# Patient Record
Sex: FEMALE | Race: WHITE | NOT HISPANIC OR LATINO | Employment: OTHER | URBAN - METROPOLITAN AREA
[De-identification: names, ages, dates, MRNs, and addresses within clinical notes are randomized per-mention and may not be internally consistent; named-entity substitution may affect disease eponyms.]

---

## 2017-02-21 ENCOUNTER — APPOINTMENT (OUTPATIENT)
Dept: WOMENS IMAGING | Facility: HOSPITAL | Age: 27
End: 2017-02-21

## 2017-02-21 PROCEDURE — 76641 ULTRASOUND BREAST COMPLETE: CPT | Performed by: RADIOLOGY

## 2017-08-15 ENCOUNTER — APPOINTMENT (OUTPATIENT)
Dept: WOMENS IMAGING | Facility: HOSPITAL | Age: 27
End: 2017-08-15

## 2017-08-15 PROCEDURE — MDREVIEWSP: Performed by: RADIOLOGY

## 2017-08-15 PROCEDURE — 76641 ULTRASOUND BREAST COMPLETE: CPT | Performed by: RADIOLOGY

## 2018-08-16 ENCOUNTER — APPOINTMENT (OUTPATIENT)
Dept: WOMENS IMAGING | Facility: HOSPITAL | Age: 28
End: 2018-08-16

## 2018-08-16 PROCEDURE — 76641 ULTRASOUND BREAST COMPLETE: CPT | Performed by: RADIOLOGY

## 2018-08-16 PROCEDURE — MDREVIEWSP: Performed by: RADIOLOGY

## 2019-06-06 ENCOUNTER — APPOINTMENT (OUTPATIENT)
Dept: WOMENS IMAGING | Facility: HOSPITAL | Age: 29
End: 2019-06-06

## 2019-06-06 PROCEDURE — 76641 ULTRASOUND BREAST COMPLETE: CPT | Performed by: RADIOLOGY

## 2019-06-11 LAB
EXTERNAL ABO GROUPING: NORMAL
EXTERNAL ANTIBODY SCREEN: NEGATIVE
EXTERNAL GROUP B STREP ANTIGEN: NEGATIVE
EXTERNAL HEPATITIS B SURFACE ANTIGEN: NEGATIVE
EXTERNAL HEPATITIS C AB: NORMAL
EXTERNAL RUBELLA QUALITATIVE: NORMAL
EXTERNAL SYPHILIS RPR SCREEN: NORMAL
HIV1 P24 AG SERPL QL IA: NEGATIVE

## 2019-06-26 ENCOUNTER — APPOINTMENT (OUTPATIENT)
Dept: WOMENS IMAGING | Facility: HOSPITAL | Age: 29
End: 2019-06-26

## 2019-06-26 PROCEDURE — 19083 BX BREAST 1ST LESION US IMAG: CPT | Performed by: RADIOLOGY

## 2019-12-27 ENCOUNTER — APPOINTMENT (OUTPATIENT)
Dept: WOMENS IMAGING | Facility: HOSPITAL | Age: 29
End: 2019-12-27

## 2019-12-27 PROCEDURE — 76641 ULTRASOUND BREAST COMPLETE: CPT | Performed by: RADIOLOGY

## 2020-01-06 ENCOUNTER — HOSPITAL ENCOUNTER (INPATIENT)
Facility: HOSPITAL | Age: 30
LOS: 2 days | Discharge: HOME | End: 2020-01-09
Attending: OBSTETRICS & GYNECOLOGY | Admitting: OBSTETRICS & GYNECOLOGY

## 2020-01-06 DIAGNOSIS — Z3A.39 39 WEEKS GESTATION OF PREGNANCY: Primary | ICD-10-CM

## 2020-01-06 PROCEDURE — C1755 CATHETER, INTRASPINAL: HCPCS

## 2020-01-07 ENCOUNTER — ANESTHESIA (OUTPATIENT)
Dept: LABOR AND DELIVERY | Facility: HOSPITAL | Age: 30
End: 2020-01-07

## 2020-01-07 ENCOUNTER — ANESTHESIA EVENT (OUTPATIENT)
Dept: LABOR AND DELIVERY | Facility: HOSPITAL | Age: 30
End: 2020-01-07

## 2020-01-07 PROBLEM — Z34.90 PREGNANCY: Status: ACTIVE | Noted: 2020-01-07

## 2020-01-07 LAB
ABO GROUP BLD: NORMAL
BASOPHILS # BLD AUTO: 0.05 10*3/MM3 (ref 0–0.2)
BASOPHILS NFR BLD AUTO: 0.3 % (ref 0–1.5)
BLD GP AB SCN SERPL QL: NEGATIVE
DEPRECATED RDW RBC AUTO: 39.8 FL (ref 37–54)
EOSINOPHIL # BLD AUTO: 0.03 10*3/MM3 (ref 0–0.4)
EOSINOPHIL NFR BLD AUTO: 0.2 % (ref 0.3–6.2)
ERYTHROCYTE [DISTWIDTH] IN BLOOD BY AUTOMATED COUNT: 12.6 % (ref 12.3–15.4)
HCT VFR BLD AUTO: 39.4 % (ref 34–46.6)
HGB BLD-MCNC: 13.9 G/DL (ref 12–15.9)
IMM GRANULOCYTES # BLD AUTO: 0.08 10*3/MM3 (ref 0–0.05)
IMM GRANULOCYTES NFR BLD AUTO: 0.5 % (ref 0–0.5)
LYMPHOCYTES # BLD AUTO: 1.53 10*3/MM3 (ref 0.7–3.1)
LYMPHOCYTES NFR BLD AUTO: 8.9 % (ref 19.6–45.3)
MCH RBC QN AUTO: 31 PG (ref 26.6–33)
MCHC RBC AUTO-ENTMCNC: 35.3 G/DL (ref 31.5–35.7)
MCV RBC AUTO: 87.8 FL (ref 79–97)
MONOCYTES # BLD AUTO: 0.86 10*3/MM3 (ref 0.1–0.9)
MONOCYTES NFR BLD AUTO: 5 % (ref 5–12)
NEUTROPHILS # BLD AUTO: 14.72 10*3/MM3 (ref 1.7–7)
NEUTROPHILS NFR BLD AUTO: 85.1 % (ref 42.7–76)
NRBC BLD AUTO-RTO: 0 /100 WBC (ref 0–0.2)
PH FLD: 7.5 [PH]
PLATELET # BLD AUTO: 201 10*3/MM3 (ref 140–450)
PMV BLD AUTO: 10.7 FL (ref 6–12)
RBC # BLD AUTO: 4.49 10*6/MM3 (ref 3.77–5.28)
RH BLD: POSITIVE
T&S EXPIRATION DATE: NORMAL
WBC NRBC COR # BLD: 17.27 10*3/MM3 (ref 3.4–10.8)

## 2020-01-07 PROCEDURE — C1755 CATHETER, INTRASPINAL: HCPCS | Performed by: ANESTHESIOLOGY

## 2020-01-07 PROCEDURE — 86900 BLOOD TYPING SEROLOGIC ABO: CPT | Performed by: OBSTETRICS & GYNECOLOGY

## 2020-01-07 PROCEDURE — 25010000002 ROPIVACAINE PER 1 MG: Performed by: STUDENT IN AN ORGANIZED HEALTH CARE EDUCATION/TRAINING PROGRAM

## 2020-01-07 PROCEDURE — 86901 BLOOD TYPING SEROLOGIC RH(D): CPT | Performed by: OBSTETRICS & GYNECOLOGY

## 2020-01-07 PROCEDURE — 25010000002 FENTANYL CITRATE (PF) 2500 MCG/50ML SOLUTION: Performed by: STUDENT IN AN ORGANIZED HEALTH CARE EDUCATION/TRAINING PROGRAM

## 2020-01-07 PROCEDURE — 85025 COMPLETE CBC W/AUTO DIFF WBC: CPT | Performed by: OBSTETRICS & GYNECOLOGY

## 2020-01-07 PROCEDURE — 86850 RBC ANTIBODY SCREEN: CPT | Performed by: OBSTETRICS & GYNECOLOGY

## 2020-01-07 RX ORDER — ACETAMINOPHEN 325 MG/1
650 TABLET ORAL EVERY 4 HOURS PRN
Status: DISCONTINUED | OUTPATIENT
Start: 2020-01-07 | End: 2020-01-08 | Stop reason: HOSPADM

## 2020-01-07 RX ORDER — ERYTHROMYCIN 5 MG/G
OINTMENT OPHTHALMIC
Status: DISPENSED
Start: 2020-01-07 | End: 2020-01-08

## 2020-01-07 RX ORDER — DIPHENHYDRAMINE HYDROCHLORIDE 50 MG/ML
12.5 INJECTION INTRAMUSCULAR; INTRAVENOUS EVERY 8 HOURS PRN
Status: DISCONTINUED | OUTPATIENT
Start: 2020-01-07 | End: 2020-01-08 | Stop reason: HOSPADM

## 2020-01-07 RX ORDER — MISOPROSTOL 200 UG/1
800 TABLET ORAL AS NEEDED
Status: DISCONTINUED | OUTPATIENT
Start: 2020-01-07 | End: 2020-01-08 | Stop reason: HOSPADM

## 2020-01-07 RX ORDER — MAGNESIUM GLUCONATE 27 MG(500)
200 TABLET ORAL DAILY
COMMUNITY
End: 2020-01-09 | Stop reason: HOSPADM

## 2020-01-07 RX ORDER — OXYTOCIN-SODIUM CHLORIDE 0.9% IV SOLN 30 UNIT/500ML 30-0.9/5 UT/ML-%
250 SOLUTION INTRAVENOUS CONTINUOUS
Status: DISPENSED | OUTPATIENT
Start: 2020-01-07 | End: 2020-01-08

## 2020-01-07 RX ORDER — MINERAL OIL
OIL (ML) MISCELLANEOUS AS NEEDED
Status: DISCONTINUED | OUTPATIENT
Start: 2020-01-07 | End: 2020-01-08 | Stop reason: HOSPADM

## 2020-01-07 RX ORDER — SODIUM CHLORIDE, SODIUM LACTATE, POTASSIUM CHLORIDE, CALCIUM CHLORIDE 600; 310; 30; 20 MG/100ML; MG/100ML; MG/100ML; MG/100ML
125 INJECTION, SOLUTION INTRAVENOUS CONTINUOUS
Status: DISCONTINUED | OUTPATIENT
Start: 2020-01-07 | End: 2020-01-08

## 2020-01-07 RX ORDER — METHYLERGONOVINE MALEATE 0.2 MG/ML
200 INJECTION INTRAVENOUS ONCE AS NEEDED
Status: DISCONTINUED | OUTPATIENT
Start: 2020-01-07 | End: 2020-01-08 | Stop reason: HOSPADM

## 2020-01-07 RX ORDER — OXYTOCIN-SODIUM CHLORIDE 0.9% IV SOLN 30 UNIT/500ML 30-0.9/5 UT/ML-%
999 SOLUTION INTRAVENOUS ONCE
Status: COMPLETED | OUTPATIENT
Start: 2020-01-07 | End: 2020-01-07

## 2020-01-07 RX ORDER — PRENATAL VIT NO.126/IRON/FOLIC 28MG-0.8MG
TABLET ORAL DAILY
COMMUNITY

## 2020-01-07 RX ORDER — PROMETHAZINE HYDROCHLORIDE 25 MG/ML
12.5 INJECTION, SOLUTION INTRAMUSCULAR; INTRAVENOUS EVERY 6 HOURS PRN
Status: DISCONTINUED | OUTPATIENT
Start: 2020-01-07 | End: 2020-01-08 | Stop reason: HOSPADM

## 2020-01-07 RX ORDER — FAMOTIDINE 20 MG/1
20 TABLET, FILM COATED ORAL AS NEEDED
Status: DISCONTINUED | OUTPATIENT
Start: 2020-01-07 | End: 2020-01-08 | Stop reason: HOSPADM

## 2020-01-07 RX ORDER — LIDOCAINE HYDROCHLORIDE 10 MG/ML
5 INJECTION, SOLUTION EPIDURAL; INFILTRATION; INTRACAUDAL; PERINEURAL AS NEEDED
Status: DISCONTINUED | OUTPATIENT
Start: 2020-01-07 | End: 2020-01-08 | Stop reason: HOSPADM

## 2020-01-07 RX ORDER — OXYTOCIN-SODIUM CHLORIDE 0.9% IV SOLN 30 UNIT/500ML 30-0.9/5 UT/ML-%
125 SOLUTION INTRAVENOUS CONTINUOUS PRN
Status: COMPLETED | OUTPATIENT
Start: 2020-01-08 | End: 2020-01-08

## 2020-01-07 RX ORDER — ONDANSETRON 4 MG/1
4 TABLET, FILM COATED ORAL EVERY 8 HOURS PRN
Status: DISCONTINUED | OUTPATIENT
Start: 2020-01-07 | End: 2020-01-08 | Stop reason: HOSPADM

## 2020-01-07 RX ORDER — OXYTOCIN-SODIUM CHLORIDE 0.9% IV SOLN 30 UNIT/500ML 30-0.9/5 UT/ML-%
0-20 SOLUTION INTRAVENOUS
Status: DISCONTINUED | OUTPATIENT
Start: 2020-01-07 | End: 2020-01-08

## 2020-01-07 RX ORDER — OXYTOCIN-SODIUM CHLORIDE 0.9% IV SOLN 30 UNIT/500ML 30-0.9/5 UT/ML-%
2-30 SOLUTION INTRAVENOUS
Status: DISCONTINUED | OUTPATIENT
Start: 2020-01-07 | End: 2020-01-07

## 2020-01-07 RX ORDER — BUTORPHANOL TARTRATE 1 MG/ML
1 INJECTION, SOLUTION INTRAMUSCULAR; INTRAVENOUS
Status: DISCONTINUED | OUTPATIENT
Start: 2020-01-07 | End: 2020-01-08 | Stop reason: HOSPADM

## 2020-01-07 RX ORDER — FAMOTIDINE 10 MG/ML
20 INJECTION, SOLUTION INTRAVENOUS AS NEEDED
Status: DISCONTINUED | OUTPATIENT
Start: 2020-01-07 | End: 2020-01-08 | Stop reason: HOSPADM

## 2020-01-07 RX ORDER — ONDANSETRON 2 MG/ML
4 INJECTION INTRAMUSCULAR; INTRAVENOUS EVERY 6 HOURS PRN
Status: DISCONTINUED | OUTPATIENT
Start: 2020-01-07 | End: 2020-01-08 | Stop reason: HOSPADM

## 2020-01-07 RX ORDER — PROMETHAZINE HYDROCHLORIDE 25 MG/1
12.5 TABLET ORAL EVERY 6 HOURS PRN
Status: DISCONTINUED | OUTPATIENT
Start: 2020-01-07 | End: 2020-01-08 | Stop reason: HOSPADM

## 2020-01-07 RX ORDER — EPHEDRINE SULFATE 50 MG/ML
5 INJECTION, SOLUTION INTRAVENOUS AS NEEDED
Status: DISCONTINUED | OUTPATIENT
Start: 2020-01-07 | End: 2020-01-08 | Stop reason: HOSPADM

## 2020-01-07 RX ORDER — PHYTONADIONE 1 MG/.5ML
INJECTION, EMULSION INTRAMUSCULAR; INTRAVENOUS; SUBCUTANEOUS
Status: DISPENSED
Start: 2020-01-07 | End: 2020-01-08

## 2020-01-07 RX ORDER — CARBOPROST TROMETHAMINE 250 UG/ML
250 INJECTION, SOLUTION INTRAMUSCULAR AS NEEDED
Status: DISCONTINUED | OUTPATIENT
Start: 2020-01-07 | End: 2020-01-08 | Stop reason: HOSPADM

## 2020-01-07 RX ORDER — PROMETHAZINE HYDROCHLORIDE 12.5 MG/1
12.5 SUPPOSITORY RECTAL EVERY 6 HOURS PRN
Status: DISCONTINUED | OUTPATIENT
Start: 2020-01-07 | End: 2020-01-08 | Stop reason: HOSPADM

## 2020-01-07 RX ORDER — ONDANSETRON 2 MG/ML
4 INJECTION INTRAMUSCULAR; INTRAVENOUS ONCE AS NEEDED
Status: DISCONTINUED | OUTPATIENT
Start: 2020-01-07 | End: 2020-01-08 | Stop reason: HOSPADM

## 2020-01-07 RX ADMIN — OXYTOCIN 999 ML/HR: 10 INJECTION, SOLUTION INTRAMUSCULAR; INTRAVENOUS at 22:49

## 2020-01-07 RX ADMIN — SODIUM CHLORIDE, POTASSIUM CHLORIDE, SODIUM LACTATE AND CALCIUM CHLORIDE 999 ML/HR: 600; 310; 30; 20 INJECTION, SOLUTION INTRAVENOUS at 13:45

## 2020-01-07 RX ADMIN — OXYTOCIN 2 MILLI-UNITS/MIN: 10 INJECTION, SOLUTION INTRAMUSCULAR; INTRAVENOUS at 14:38

## 2020-01-07 RX ADMIN — SODIUM CHLORIDE, POTASSIUM CHLORIDE, SODIUM LACTATE AND CALCIUM CHLORIDE 999 ML/HR: 600; 310; 30; 20 INJECTION, SOLUTION INTRAVENOUS at 10:06

## 2020-01-07 RX ADMIN — ROPIVACAINE HYDROCHLORIDE 10 ML/HR: 2 INJECTION, SOLUTION EPIDURAL; INFILTRATION at 19:48

## 2020-01-07 RX ADMIN — SODIUM CHLORIDE, POTASSIUM CHLORIDE, SODIUM LACTATE AND CALCIUM CHLORIDE 999 ML/HR: 600; 310; 30; 20 INJECTION, SOLUTION INTRAVENOUS at 16:53

## 2020-01-07 RX ADMIN — ROPIVACAINE HYDROCHLORIDE 10 ML/HR: 2 INJECTION, SOLUTION EPIDURAL; INFILTRATION at 13:28

## 2020-01-07 NOTE — H&P
[facility]  Obstetric History and Physical    Chief Complaint   Patient presents with   • Leaking Fluid     Patient having gushes of fluid with contractions through the day. ROM at 0500   • Contractions     Pt ctx through the day getting consistent and strong around 1600.        Subjective     Patient is a 29 y.o. female  currently at 39w3d, who presents with gushes of fluid all day, not much earlier in day but more volume lately.  Kayla all day- now between 3-5 min.    Her prenatal care is benign.  Her previous obstetric/gynecological history is noted for is non-contributory.    The following portions of the patients history were reviewed and updated as appropriate: current medications, allergies, past medical history, past surgical history, past family history, past social history and problem list .       Prenatal Information:  Prenatal Results     POC Urine Glucose/Protein     Test Value Reference Range Date Time    Urine Glucose        Urine Protein              Initial Prenatal Labs     Test Value Reference Range Date Time    Hemoglobin        Hematocrit        Platelets        Rubella IgG        Hepatitis B SAg        Hepatitis C Ab        RPR        ABO        Rh        Antibody Screen        HIV        Urine Culture        Gonorrhea        Chlamydia        TSH              2nd and 3rd Trimester     Test Value Reference Range Date Time    Hemoglobin (repeated)        Hematocrit (repeated)        GCT        Antibody Screen (repeated)        GTT Fasting        GTT 1 Hr        GTT 2 Hr        GTT 3 Hr        Group B Strep              Drug Screening     Test Value Reference Range Date Time    Amphetamine Screen        Barbiturate Screen        Benzodiazepine Screen        Methadone Screen        Phencyclidine Screen        Opiates Screen        THC Screen        Cocaine Screen        Propoxyphene Screen        Buprenorphine Screen        Methamphetamine Screen        Oxycodone Screen         Tricyclic Antidepressants Screen              Other (Risk screening)     Test Value Reference Range Date Time    Varicella IgG        Parvovirus IgG        CMV IgG        Cystic Fibrosis        Hemoglobin electrophoresis        NIPT        MSAFP-4        AFP (for NTD only)                       Past OB History:     OB History    Para Term  AB Living   1 0 0 0 0 0   SAB TAB Ectopic Molar Multiple Live Births   0 0 0 0 0 0      # Outcome Date GA Lbr Vinod/2nd Weight Sex Delivery Anes PTL Lv   1 Current                Past Medical History: Past Medical History:   Diagnosis Date   • Migraine       Past Surgical History Past Surgical History:   Procedure Laterality Date   • BREAST BIOPSY         • WISDOM TOOTH EXTRACTION        Family History: Family History   Problem Relation Age of Onset   • Cancer Maternal Grandmother         Colon Cancer   • Cancer Other         Breast Cancer, Colon Cancer   • Cancer Other         Colon Cancer      Social History:  reports that she quit smoking about 7 years ago. She does not have any smokeless tobacco history on file.   reports that she drank alcohol.   reports that she does not use drugs.        General ROS: Pertinent items are noted in HPI    Objective       Vital Signs Range for the last 24 hours  Temperature: Temp:  [98.4 °F (36.9 °C)] 98.4 °F (36.9 °C)   Temp Source:     BP: BP: (129)/(73) 129/73   Pulse: Heart Rate:  [90] 90   Respirations:     SPO2:     O2 Amount (l/min):     O2 Devices     Weight: Weight:  [74.9 kg (165 lb 3.2 oz)] 74.9 kg (165 lb 3.2 oz)     Physical Examination: General appearance - alert, well appearing, and in no distress  Chest - clear to auscultation, no wheezes, rales or rhonchi, symmetric air entry  Heart - normal rate, regular rhythm, normal S1, S2, no murmurs, rubs, clicks or gallops  Abdomen - soft, nontender, gravid, no masses or organomegaly  Pelvic - normal external genitalia, vulva, vagina, cervix, uterus and  adnexa    Presentation: cephalic   Cervix: Exam by: Method: sterile exam per RN(Chica Elizabeth RN)   Dilation: Cervical Dilation (cm): 1-2   Effacement: Cervical Effacement: 80%   Station:         Fetal Heart Rate Assessment   Method:     Beats/min:     Baseline:     Variability:     Accels:     Decels:     Tracing Category:       Uterine Assessment   Method:     Frequency (min):     Ctx Count in 10 min:     Duration:     Intensity:     Intensity by IUPC:     Resting Tone:     Resting Tone by IUPC:     Dick Units:           Assessment/Plan       Pregnancy        Assessment:  1.  Intrauterine pregnancy at 39w3d gestation with reassuring fetal status.    2.  Ruptured membranes since 5 am- currently 17 hours  3.  Obstetrical history significant for is non-contributory.  4.  GBS status: negative    Plan:  1. Pt strongly desires unmedicated delivery.  Interested in nitrous oxide.  Explained risks of prolonged rupture of membranes- will monitor for signs of infection.  May want to augment if doesn't get active soon.  2. Plan of care has been reviewed with patient and .  3.  Risks, benefits of treatment plan have been discussed.  4.  All questions have been answered.        Darya Swanson MD  1/7/2020  12:49 AM

## 2020-01-07 NOTE — PLAN OF CARE
Pt currently laboring. States she is coping with labor at this time.  Plans to have unmedicated birth.  Intermittent monitoring currently.  IV site saline locked.   at bedside for support.  All questions answered.  All needs met per staff.  No s/s of distress noted or voiced.

## 2020-01-07 NOTE — ANESTHESIA PROCEDURE NOTES
Labor Epidural      Patient reassessed immediately prior to procedure    Patient location during procedure: OB  Start Time: 1/7/2020 1:15 PM  Stop Time: 1/7/2020 1:27 PM  Indication:at surgeon's request  Performed By  Anesthesiologist: Geo Tan MD  Preanesthetic Checklist  Completed: patient identified, site marked, surgical consent, pre-op evaluation, timeout performed, IV checked, risks and benefits discussed and monitors and equipment checked  Prep:  Pt Position:sitting  Sterile Tech:gloves, cap, sterile barrier and mask  Prep:chlorhexidine gluconate and isopropyl alcohol  Monitoring:continuous pulse oximetry, EKG and blood pressure monitoring  Epidural Block Procedure:  Approach:midline  Guidance:landmark technique  Location:L4-L5  Needle Type:Tuohy  Needle Gauge:19 G  Loss of Resistance Medium: saline  Loss of Resistance: 7cm  Cath Depth at skin:12 cm  Paresthesia: none  Aspiration:negative  Test Dose:negative  Number of Attempts: 1  Post Assessment:  Dressing:secured with tape and occlusive dressing applied  Pt Tolerance:patient tolerated the procedure well with no apparent complications  Complications:no

## 2020-01-07 NOTE — NURSING NOTE
At 0956, breast pump stimulation for augmentation started.  Patient encouraged for 10 minutes on, 10-20 minutes off.  Patient to stay on monitor during breast stimulation to ensure there is no uterine tachysystole.   at bedside assisting patient.  Patient and  verbalize understanding.

## 2020-01-07 NOTE — NURSING NOTE
At 1030, nipple stimulation discontinued per patient request.  At 1046, patient up and ambulating in shower.  Plan to put back on FHR monitor at 1125.

## 2020-01-07 NOTE — ANESTHESIA PREPROCEDURE EVALUATION
Anesthesia Evaluation     Patient summary reviewed and Nursing notes reviewed   no history of anesthetic complications:  NPO Solid Status: > 8 hours  NPO Liquid Status: > 2 hours           Airway   Mallampati: II  TM distance: >3 FB  Neck ROM: full  No difficulty expected  Dental - normal exam     Pulmonary     breath sounds clear to auscultation  Cardiovascular   Exercise tolerance: good (4-7 METS)    Rhythm: regular  Rate: normal        Neuro/Psych  (+) headaches,     GI/Hepatic/Renal/Endo      Musculoskeletal     Abdominal    Substance History      OB/GYN    (+) Pregnant,         Other                        Anesthesia Plan    ASA 2     epidural   (39w3d)    Anesthetic plan, all risks, benefits, and alternatives have been provided, discussed and informed consent has been obtained with: patient.

## 2020-01-08 LAB
BASOPHILS # BLD AUTO: 0.06 10*3/MM3 (ref 0–0.2)
BASOPHILS NFR BLD AUTO: 0.3 % (ref 0–1.5)
DEPRECATED RDW RBC AUTO: 39.8 FL (ref 37–54)
EOSINOPHIL # BLD AUTO: 0.04 10*3/MM3 (ref 0–0.4)
EOSINOPHIL NFR BLD AUTO: 0.2 % (ref 0.3–6.2)
ERYTHROCYTE [DISTWIDTH] IN BLOOD BY AUTOMATED COUNT: 12.1 % (ref 12.3–15.4)
HCT VFR BLD AUTO: 34.8 % (ref 34–46.6)
HGB BLD-MCNC: 12.3 G/DL (ref 12–15.9)
IMM GRANULOCYTES # BLD AUTO: 0.08 10*3/MM3 (ref 0–0.05)
IMM GRANULOCYTES NFR BLD AUTO: 0.5 % (ref 0–0.5)
LYMPHOCYTES # BLD AUTO: 1.91 10*3/MM3 (ref 0.7–3.1)
LYMPHOCYTES NFR BLD AUTO: 10.9 % (ref 19.6–45.3)
MCH RBC QN AUTO: 31.3 PG (ref 26.6–33)
MCHC RBC AUTO-ENTMCNC: 35.3 G/DL (ref 31.5–35.7)
MCV RBC AUTO: 88.5 FL (ref 79–97)
MONOCYTES # BLD AUTO: 1.22 10*3/MM3 (ref 0.1–0.9)
MONOCYTES NFR BLD AUTO: 7 % (ref 5–12)
NEUTROPHILS # BLD AUTO: 14.14 10*3/MM3 (ref 1.7–7)
NEUTROPHILS NFR BLD AUTO: 81.1 % (ref 42.7–76)
NRBC BLD AUTO-RTO: 0 /100 WBC (ref 0–0.2)
PLATELET # BLD AUTO: 180 10*3/MM3 (ref 140–450)
PMV BLD AUTO: 11 FL (ref 6–12)
RBC # BLD AUTO: 3.93 10*6/MM3 (ref 3.77–5.28)
WBC NRBC COR # BLD: 17.45 10*3/MM3 (ref 3.4–10.8)

## 2020-01-08 PROCEDURE — 0HQ9XZZ REPAIR PERINEUM SKIN, EXTERNAL APPROACH: ICD-10-PCS | Performed by: OBSTETRICS & GYNECOLOGY

## 2020-01-08 PROCEDURE — 85025 COMPLETE CBC W/AUTO DIFF WBC: CPT | Performed by: OBSTETRICS & GYNECOLOGY

## 2020-01-08 RX ORDER — ONDANSETRON 4 MG/1
4 TABLET, FILM COATED ORAL EVERY 8 HOURS PRN
Status: DISCONTINUED | OUTPATIENT
Start: 2020-01-08 | End: 2020-01-09 | Stop reason: HOSPADM

## 2020-01-08 RX ORDER — ONDANSETRON 2 MG/ML
4 INJECTION INTRAMUSCULAR; INTRAVENOUS EVERY 6 HOURS PRN
Status: DISCONTINUED | OUTPATIENT
Start: 2020-01-08 | End: 2020-01-09 | Stop reason: HOSPADM

## 2020-01-08 RX ORDER — LANOLIN
CREAM (ML) TOPICAL
Status: DISCONTINUED | OUTPATIENT
Start: 2020-01-08 | End: 2020-01-09 | Stop reason: HOSPADM

## 2020-01-08 RX ORDER — IBUPROFEN 800 MG/1
800 TABLET ORAL EVERY 8 HOURS PRN
Status: DISCONTINUED | OUTPATIENT
Start: 2020-01-08 | End: 2020-01-09 | Stop reason: HOSPADM

## 2020-01-08 RX ORDER — OXYCODONE HYDROCHLORIDE AND ACETAMINOPHEN 5; 325 MG/1; MG/1
2 TABLET ORAL EVERY 4 HOURS PRN
Status: DISCONTINUED | OUTPATIENT
Start: 2020-01-08 | End: 2020-01-09 | Stop reason: HOSPADM

## 2020-01-08 RX ORDER — HYDROMORPHONE HYDROCHLORIDE 1 MG/ML
0.5 INJECTION, SOLUTION INTRAMUSCULAR; INTRAVENOUS; SUBCUTANEOUS
Status: DISCONTINUED | OUTPATIENT
Start: 2020-01-08 | End: 2020-01-09 | Stop reason: HOSPADM

## 2020-01-08 RX ORDER — OXYCODONE HYDROCHLORIDE AND ACETAMINOPHEN 5; 325 MG/1; MG/1
1 TABLET ORAL EVERY 4 HOURS PRN
Status: DISCONTINUED | OUTPATIENT
Start: 2020-01-08 | End: 2020-01-09 | Stop reason: HOSPADM

## 2020-01-08 RX ORDER — PROMETHAZINE HYDROCHLORIDE 25 MG/1
25 TABLET ORAL EVERY 6 HOURS PRN
Status: DISCONTINUED | OUTPATIENT
Start: 2020-01-08 | End: 2020-01-09 | Stop reason: HOSPADM

## 2020-01-08 RX ORDER — OXYCODONE AND ACETAMINOPHEN 10; 325 MG/1; MG/1
1 TABLET ORAL EVERY 4 HOURS PRN
Status: DISCONTINUED | OUTPATIENT
Start: 2020-01-08 | End: 2020-01-09 | Stop reason: HOSPADM

## 2020-01-08 RX ORDER — PROMETHAZINE HYDROCHLORIDE 25 MG/ML
12.5 INJECTION, SOLUTION INTRAMUSCULAR; INTRAVENOUS EVERY 6 HOURS PRN
Status: DISCONTINUED | OUTPATIENT
Start: 2020-01-08 | End: 2020-01-09 | Stop reason: HOSPADM

## 2020-01-08 RX ORDER — PROMETHAZINE HYDROCHLORIDE 25 MG/1
12.5 SUPPOSITORY RECTAL EVERY 6 HOURS PRN
Status: DISCONTINUED | OUTPATIENT
Start: 2020-01-08 | End: 2020-01-09 | Stop reason: HOSPADM

## 2020-01-08 RX ORDER — DOCUSATE SODIUM 100 MG/1
100 CAPSULE, LIQUID FILLED ORAL 2 TIMES DAILY
Status: DISCONTINUED | OUTPATIENT
Start: 2020-01-08 | End: 2020-01-09 | Stop reason: HOSPADM

## 2020-01-08 RX ORDER — CALCIUM CARBONATE 200(500)MG
2 TABLET,CHEWABLE ORAL 3 TIMES DAILY PRN
Status: DISCONTINUED | OUTPATIENT
Start: 2020-01-08 | End: 2020-01-09 | Stop reason: HOSPADM

## 2020-01-08 RX ORDER — HYDROXYZINE 50 MG/1
50 TABLET, FILM COATED ORAL NIGHTLY PRN
Status: DISCONTINUED | OUTPATIENT
Start: 2020-01-08 | End: 2020-01-09 | Stop reason: HOSPADM

## 2020-01-08 RX ORDER — MISOPROSTOL 200 UG/1
600 TABLET ORAL ONCE AS NEEDED
Status: DISCONTINUED | OUTPATIENT
Start: 2020-01-08 | End: 2020-01-09 | Stop reason: HOSPADM

## 2020-01-08 RX ADMIN — IBUPROFEN 800 MG: 800 TABLET, FILM COATED ORAL at 13:58

## 2020-01-08 RX ADMIN — OXYTOCIN 125 ML/HR: 10 INJECTION, SOLUTION INTRAMUSCULAR; INTRAVENOUS at 00:09

## 2020-01-08 RX ADMIN — IBUPROFEN 800 MG: 800 TABLET, FILM COATED ORAL at 01:51

## 2020-01-08 RX ADMIN — Medication: at 08:15

## 2020-01-08 RX ADMIN — IBUPROFEN 800 MG: 800 TABLET, FILM COATED ORAL at 22:13

## 2020-01-08 RX ADMIN — Medication: at 01:51

## 2020-01-08 RX ADMIN — HYDROCORTISONE 2.5% 1 APPLICATION: 25 CREAM TOPICAL at 08:15

## 2020-01-08 RX ADMIN — DOCUSATE SODIUM 100 MG: 100 CAPSULE, LIQUID FILLED ORAL at 22:13

## 2020-01-08 RX ADMIN — DOCUSATE SODIUM 100 MG: 100 CAPSULE, LIQUID FILLED ORAL at 08:15

## 2020-01-08 NOTE — PROGRESS NOTES
Pt was initially very resistant to any intervention.  After counseling patient regarding length of time of ROM and no progression with minimal ctx, she decided to get an epidural earlier and allowed us to begin pitocin.  She is up to 9miu.  Her pain is well controlled. She is tearful and tired. Cx is 9/c/0 with a very small forebag and ruptured. Good bloody show noted. Pelvis adequate. efw 7pds.  Pt still AF with no fetal tachycardia or maternal tachycardia.  Will recheck and start pushing in 1-2hrs.  NST reactive and FSR    KMM

## 2020-01-08 NOTE — PLAN OF CARE
on 20 at 2244.  Bleeding scant-light.  Uterus firm, midline, 2 cm below umbilicus.  Will continue to monitor.

## 2020-01-08 NOTE — LACTATION NOTE
Mom wanting assistance with latching. Educated on starting nose to nipple. Baby was able to get deep latch and is nursing well. Mom has personal pump at home. Encouraged to call if needing further assistance.    Lactation Consult Note    Evaluation Completed: 2020 4:44 PM  Patient Name: Maria D Conway  :  1990  MRN:  [id number]     REFERRAL  INFORMATION:                                         DELIVERY HISTORY:          Skin to skin initiation date/time: 2020  10:45 PM   Skin to skin end date/time:              MATERNAL ASSESSMENT:                               INFANT ASSESSMENT:  Information for the patient's :  Myesha Conwaygerson [[id number]]   No past medical history on file.                                                                                                                                MATERNAL INFANT FEEDING:                                                                       EQUIPMENT TYPE:                                 BREAST PUMPING:          LACTATION REFERRALS:

## 2020-01-08 NOTE — PROGRESS NOTES
[facility]  Vaginal Delivery Progress Note    Patient Name: Maria D Conway  :  1990  MRN:  [id number]      Subjective   Postpartum Day 1: Vaginal Delivery of a female infant.     The patient feels well without complaints.  Her pain is well controlled.  Reports normal lochia.     The patient plans to breastfeed.    Objective     Vital Signs Range for the last 24 hours  Temperature: Temp:  [97.7 °F (36.5 °C)-99.6 °F (37.6 °C)] 98.1 °F (36.7 °C)       BP: BP: (101-160)/(53-83) 111/65   Pulse: Heart Rate:  [] 79   Respirations: Resp:  [14-20] 18                       Physical Exam:  General: Awake and alert  Abdomen: Fundus: firm, non tender, 1 below umbilicus  Extremities:  trace edema, NT     Labs:     Results from last 7 days   Lab Units 20  0609 20  0047   WBC 10*3/mm3 17.45* 17.27*   HEMOGLOBIN g/dL 12.3 13.9   HEMATOCRIT % 34.8 39.4   PLATELETS 10*3/mm3 180 201       Prenatal labs results reviewed:  Yes   Rubella:  Immune  Rh Status:    RH type   Date Value Ref Range Status   2020 Positive  Final         Assessment/Plan  : 1. PPD1 S/P  - Doing well, continue usual cares.           Pregnancy          Kimberly Willingham, APRN  2020  9:32 AM

## 2020-01-08 NOTE — L&D DELIVERY NOTE
[facility]  Vaginal Delivery Note    Patient Name: Maria D Conway  :  1990  MRN:  [id number]      Delivery     Delivery: Vaginal, Spontaneous     YOB: 2022    Time of Birth: 10:44 PM      Anesthesia: Epidural     Delivering clinician: Alba Johnson MD       Infant    Findings: Viable female  infant    Infant observations: Weight: 3501 g (7 lb 11.5 oz)   Observations/Comments:  scale 4      Apgars: 8   @ 1 minute /    9   @ 5 minutes     Placenta, Cord, and Fluid    Placenta delivered  Expressed   at  2020 10:49 PM     Cord: 3 vessels  present.   Cord blood obtained: Yes    Cord gases obtained:  No      Repair    Episiotomy: No   Lacerations: First degree   Estimated Blood Loss: 300  mls.       Delivery narrative: The patient is a 29 y.o.  at 39w3d.  Presented with srom. She initially was hopeful for ncb and didn't have any intervention for approximately 30hours after ROM. She eventually received an epidural, pitocin augmention. Progressed normally to C/C/+1. Fetal status reassuring throughout.  of viable female infant  @ 10:44 PM  over an intact perineum. ASDE. 3501 g (7 lb 11.5 oz) .  Placenta delivered spontaneously, intact with 3 vessel cord. Cervix and rectum intact. first degree laceration repaired in usual fashion with 3-0vicryl suture. Mother and baby recovering good condition.       Alba Johnson MD  20  8:14 AM

## 2020-01-09 VITALS
HEIGHT: 66 IN | TEMPERATURE: 97.9 F | WEIGHT: 165.2 LBS | DIASTOLIC BLOOD PRESSURE: 70 MMHG | RESPIRATION RATE: 16 BRPM | OXYGEN SATURATION: 98 % | SYSTOLIC BLOOD PRESSURE: 113 MMHG | BODY MASS INDEX: 26.55 KG/M2 | HEART RATE: 73 BPM

## 2020-01-09 RX ORDER — IBUPROFEN 800 MG/1
800 TABLET ORAL EVERY 8 HOURS PRN
Qty: 60 TABLET | Refills: 0 | Status: SHIPPED | OUTPATIENT
Start: 2020-01-09

## 2020-01-09 RX ORDER — OXYCODONE HYDROCHLORIDE AND ACETAMINOPHEN 5; 325 MG/1; MG/1
2 TABLET ORAL EVERY 4 HOURS PRN
Qty: 20 TABLET | Refills: 0 | Status: SHIPPED | OUTPATIENT
Start: 2020-01-09 | End: 2020-01-11

## 2020-01-09 RX ADMIN — IBUPROFEN 800 MG: 800 TABLET, FILM COATED ORAL at 08:36

## 2020-01-09 RX ADMIN — DOCUSATE SODIUM 100 MG: 100 CAPSULE, LIQUID FILLED ORAL at 08:35

## 2020-01-09 NOTE — DISCHARGE SUMMARY
Date of Discharge:  2020    Discharge Diagnosis: vaginal delivery    Presenting Problem/History of Present Illness  Pregnancy [Z34.90]  Pregnancy [Z34.90]       Hospital Course  Patient is a 29 y.o. female presented with SROM at term.  Delivered viable female infant per Dr. Johnson.  No pp complications.  Ready for d/c.      Procedures Performed         Consults:   Consults     No orders found from 2019 to 2020.          Condition on Discharge:   Subjective   Postpartum Day 2 Vaginal Delivery.    The patient feels well without complaints.    Vital Signs  Temp:  [97.9 °F (36.6 °C)-98 °F (36.7 °C)] 97.9 °F (36.6 °C)  Heart Rate:  [73-90] 73  Resp:  [16-18] 16  BP: (106-113)/(69-70) 113/70    Physical Exam:   General: Awake and alert   Abdomen: Fundus: firm, non tender    Extremities:  Calves NT bilaterally    Assessment/Plan     PPD2  S/P  -   Stable for discharge. Instructions reviewed      Discharge Disposition  Home or Self Care    Discharge Medications     Discharge Medications      New Medications      Instructions Start Date   ibuprofen 800 MG tablet  Commonly known as:  ADVIL,MOTRIN   800 mg, Oral, Every 8 Hours PRN      oxyCODONE-acetaminophen 5-325 MG per tablet  Commonly known as:  PERCOCET   2 tablets, Oral, Every 4 Hours PRN         Continue These Medications      Instructions Start Date   prenatal (CLASSIC) vitamin 28-0.8 MG tablet tablet   Oral, Daily         Stop These Medications    magnesium gluconate 500 MG tablet  Commonly known as:  MAGONATE     MINASTRIN 24 FE 1-20 MG-MCG(24) chewable tablet  Generic drug:  Norethin Ace-Eth Estrad-FE     triamcinolone 0.1 % cream  Commonly known as:  KENALOG              The patient has been prescribed a controlled substance.  She has been counseled on the risks associated with using the medication.   The addictive potential of this medication and alternatives were discussed carefully with this patient and she demonstrated understanding.  ELHAM JURADO  report has been obtained and reviewed.       Activity at Discharge: restrictions reviewed    Follow-up Appointments  No future appointments.      Test Results Pending at Discharge       BERRY Yan  01/09/20  9:02 AM

## 2020-01-09 NOTE — LACTATION NOTE
P1T, baby currently latched deeply with nutritive suckling. Mom denies pain or questions. [facility] card given if needing f/u after d/c.

## 2021-04-16 ENCOUNTER — BULK ORDERING (OUTPATIENT)
Dept: CASE MANAGEMENT | Facility: OTHER | Age: 31
End: 2021-04-16

## 2021-04-16 DIAGNOSIS — Z23 IMMUNIZATION DUE: ICD-10-CM

## 2021-10-19 ENCOUNTER — E-VISIT (OUTPATIENT)
Dept: FAMILY MEDICINE CLINIC | Facility: TELEHEALTH | Age: 31
End: 2021-10-19

## 2021-10-19 PROCEDURE — BRIGHTMDVISIT: Performed by: NURSE PRACTITIONER

## 2021-10-19 NOTE — E-VISIT TREATED
Chief Complaint: Coronavirus (COVID-19), cold, sinus pain, allergy, or flu   Patient introduction   Patient is 31-year-old female who reports cough, congestion, rhinitis, itchy nose or sneezing, and voice hoarseness that started 3-5 days ago.   Patient has not requested COVID testing.   Previous history of COVID-19 testing: None.   Coronavirus Disease 2019 (COVID-19) exposure:    No known exposure to a confirmed or suspected case of COVID-19    No recent travel outside of their local community   Reports receiving 2 doses of Pfizer vaccine. Received their most recent dose of the vaccine > 14 days ago.   When asked why they're seeking care online today, patient reports they want a specific treatment or medication and just want to feel better.   Patient-submitted comments:   Patient writes: no.   Patient did not request an excuse note.   General presentation   Symptoms came on gradually.   Fever:    Denies fever.   Sinus and nasal symptoms:    Reports rhinitis.    Reports itchy nose or sneezing.    Reports yellow nasal drainage.    Nasal drainage is thick.    Reports postnasal drip.    Reports congestion with sinus pain or pressure on or around their eyes and nose.    Patient first noticed sinus pain 5-9 days ago.    Sinus pain is worse with Valsalva.    Denies history of unhealed nasal septal ulcer/nasal wound.    Denies antibiotic treatment for sinus infection in the last year.    Denies history of deviated septum or nasal polyps.   Sore throat:    Reports mild hoarseness. Patient doesn't believe hoarseness is due to voice strain.    Denies sore throat.   Head and body aches:    Denies headache.    Denies sweats.    Denies chills.    Denies myalgia.    Denies fatigue.   Dizziness:    Denies dizziness.   Cough:    Reports cough.    Cough is worse in the morning, during the day, and at night/while sleeping.    Cough is productive of sputum.    Describes color of mucus as green.   Wheezing and SOB:    Reports  previous albuterol inhaler use during URIs, bronchitis, or pneumonia.    Reports previous steroid inhaler use during URIs, bronchitis, or pneumonia.    Not using an albuterol inhaler for current symptoms because they don't have any available.    Patient requests a prescription of albuterol in the form of an inhaler.    Denies COPD diagnosis.    Denies asthma diagnosis.    Denies wheezing.    Denies shortness of breath.   Chest pain:    Reports chest pain, but only when coughing.    [place] Heart Score (MHS): 0, low risk of CAD. Assigning 1 point to each of 5 criteria (female >= 65 years old or male >= 55 years, known CAD, pain worse with exercise, pain not reproducible with palpation, and patient assumes pain is cardiac), the MHS is a validated clinical decision rule used to rule out coronary artery disease in primary care patients with chest pain.   Allergies:    Reports history of allergies.    Patient does not think symptoms are allergy-related.    Patient has known seasonal allergies and known pet allergies.   Flu exposure:    Denies recent exposure to confirmed flu diagnosis.    Denies receiving a flu vaccine this season.   Patient denies the following red flags:    Changes in alertness or awareness    Decreased urination   Pregnancy/menstrual status/breastfeeding:    Denies being pregnant    Patient is breastfeeding    Regarding last menstrual period, patient writes: Yes monthly and normal   Self-exam:    No difficulty moving their chin toward their chest    Swollen and tender neck lymph nodes   Denies antibiotic treatment for similar symptoms within the past month.   Current medications   Reports taking over-the-counter medication for current symptoms. Patient has taken guaifenesin and loratadine.   Denies taking other medications or supplements.   Medication allergies   None.   Medication contraindication review   Because patient is breastfeeding, baloxavir, levofloxacin, and moxifloxacin will not be  prescribed.   Denies history of anaphylactic reaction to beta-lactam antibiotics; aspirin triad; blood dyscrasia; bone marrow depression; catecholamine-releasing paraganglioma; coronary artery disease; coagulation disorder; congenital long QT syndrome; depression; electrolyte abnormalities; fungal infection; GI bleeding; GI obstruction; G6PD deficiency; heart arrhythmia; hypertension; kidney disease or hemodialysis; mononucleosis; myasthenia; recent myocardial infarction; NSAID-induced asthma/urticaria; Parkinson's disease; pheochromocytoma; porphyria; Reye syndrome; seizure disorder; ulcerative colitis; and urinary retention.   Denies history of metoclopramide-associated dystonic reaction and tardive dyskinesia.   No known history of amoxicillin-clavulanate-associated cholestatic jaundice or hepatic impairment.   No known history of azithromycin-associated cholestatic jaundice or hepatic impairment.   Past medical history   Immune conditions: Denies immunocompromising conditions. Denies history of cancer.   Social history   High-risk household contacts: Patient's household includes one or more members of a group with risk factors for influenza complications, including a child < 5 years.   Former smoker.   Assessment   Bacterial sinusitis. Ruled out: Traumatic laryngitis.   This is the likely diagnosis based on patient's interview responses, including:    Congestion or sinus pressure    Thick nasal discharge    Yellow or green mucus    Short duration of symptoms indicating severe symptoms at onset   HHS required information for COVID-19 lab data reporting (if test is ordered):   Symptoms:    Cough    Nasal congestion    Rhinitis   Symptom onset: 3-5 days ago ago  Pregnancy: No or N/A  Healthcare worker? No  Resident in a congregate care setting? No  Previous history of COVID-19 testing: None     Plan   Medications:    albuterol sulfate HFA 90 mcg/actuation aerosol inhaler RX 90mcg/puff 2 puffs inhaled q6h PRN 10d  for wheezing. If symptoms are severe, may use as often as every 4 hours. Amount is 18 g.    Mucus Relief  mg tablet, extended release RX 600mg 1 tab PO q12h PRN 7d for cough and congestion. Amount is 14 tab.    amoxicillin 500 mg capsule RX 500mg 1 cap PO tid 7d for infection. This medication is an antibiotic. Take it exactly as directed. You must finish the entire course of medication, even if you feel better after the first few days of treatment. Amount is 21 cap.   The patient's prescriptions will be sent to:   [facility] [id number] [place] [address]   Phone: [phone]     Fax: [phone]   Education:    Condition and causes    Prevention    Treatment and self-care    When to call provider      ----------   Electronically signed by BERRY Rider on 2021-10-19 at 13:05PM   ----------   Patient Interview Transcript:   Why are you getting care through [facility] today? We can't guarantee a specific treatment or test. Your provider will decide what's best for you. Select all that apply.    I want a specific treatment or medication    I just want to feel better!   Not selected:    I want to know if I have a cold or something more serious    I want to know if I need to be seen by a provider    I need a doctor's note    I want to be tested for COVID-19    I want to get the COVID-19 vaccine    I think I'm having side effects from the COVID-19 vaccine    None of the above   Which of these symptoms are bothering you? Select all that apply.    Cough    Stuffed-up nose or sinuses    Runny nose    Itchy nose or sneezing    Hoarse voice or loss of voice   Not selected:    Shortness of breath    Fever    Itchy or watery eyes    Loss of smell or taste    Sore throat    Headache    Sweats    Chills    Muscle or body aches    Fatigue or tiredness    Nausea or vomiting    Diarrhea    I don't have any of these symptoms   Before we learn more about why you're here, we'll get some information  related to COVID-19. We'll ask about risk factors, testing, vaccination status, and exposure. Do you have any of these conditions? If so, you may be at increased risk for complications from COVID-19. Select all that apply.    None of the above   Not selected:    Chronic lung disease, such as cystic fibrosis or interstitial fibrosis    Heart disease, such as congenital heart disease, congestive heart failure, or coronary artery disease    Disorder of the brain, spinal cord, or nerves and muscles, such as dementia, cerebral palsy, epilepsy, muscular dystrophy, or developmental delay    Metabolic disorder or mitochondrial disease    Cerebrovascular disease, such as stroke or another condition affecting the blood vessels or blood supply to the brain   Do you live in a group care setting? Examples include: - Nursing home - Residential care - Psychiatric treatment facility - [facility] - [facility] - Board and care home - Homeless shelter - Foster care setting Select one.    No   Not selected:    Yes   Have you ever been tested for COVID-19? Select one.    No   Not selected:    Yes   Have you gotten the COVID-19 vaccine? Select one.    Yes   Not selected:    No   Which COVID-19 vaccine did you get? Check your Vaccination Record Card under Product Name/. Select one.    Pfizer-BioNTech (Pfizer)   Not selected:    [employer]'s Doris Vaccine (J&J/Doris)    Moderna   How many doses of the COVID-19 vaccine have you gotten? Select one.    2 doses   Not selected:    1 dose   When did you get your most recent dose of the COVID-19 vaccine?    More than 14 days ago   Not selected:    Less than 48 hours (2 days) ago    48 to 72 hours (3 days) ago    3 to 5 days ago    5 to 7 days ago    7 to 14 days ago   In the last 14 days, have you traveled outside of your local community? This includes travel by car, RV, bus, train, or plane. Travel increases your chances of getting and spreading COVID-19. Select one.    " No   Not selected:    Yes   In the last 14 days, have you had close contact with someone who has coronavirus (COVID-19)? \"Close contact\" means any of these: - Living in the same household as someone with COVID-19. - Caring for someone with COVID-19. - Being within 6 feet of someone with COVID-19 for a total of at least 15 minutes over a 24-hour period. For example, three 5-minute exposures for a total of 15 minutes. - Being in direct contact with respiratory droplets from someone with COVID-19 (being coughed on, kissing, sharing utensils). Select one.    No, not that I know of   Not selected:    Yes, a confirmed case    Yes, a suspected case   Thanks for completing our COVID-19 questions. Now we'll return to your symptoms. When did your symptoms start? If you know the exact date your symptoms started, choose Other and enter the month and day. Select one.    3 to 5 days ago   Not selected:    Less than 48 hours ago    6 to 9 days ago    10 to 14 days ago    2 to 4 weeks ago    More than a month ago    Other (specify)   Did your symptoms come on suddenly or gradually? Select one.    Gradually   Not selected:    Suddenly    I'm not sure   Do you cough so hard that it's made you gag or vomit? By gag, we mean has your coughing made you choke or dry heave? Select all that apply.    No   Not selected:    Yes, my coughing has made me gag    Yes, my coughing has made me vomit   When is your cough the worst? Select all that apply.    In the morning, or when I wake up    During the day    At nighttime, or while I'm sleeping   Not selected:    I'm not sure   Are you coughing up mucus or phlegm? Select one.    Yes, a lot   Not selected:    No, my cough is dry    Yes, a little   What color is most of the mucus or phlegm that you're coughing up? Select one.    Green   Not selected:    Clear    White/frothy    Yellow    Red or pink    I'm not sure   You mentioned having a stuffy nose or sinus congestion. Do you feel pain or " "pressure in your sinuses?    Yes   Not selected:    No    I'm not sure   Where do you feel sinus pain or pressure?    Around my eyes    Behind my nose   Not selected:    In my forehead    In my cheeks    In my upper teeth or jaw    I'm not sure   When did you first notice your sinus pain or pressure? Select one.    5 to 9 days ago   Not selected:    Less than 5 days ago    10 to 14 days ago    2 to 4 weeks ago    1 month ago or longer   Does coughing, sneezing, or leaning forward make your sinuses feel worse? Select one.    Yes   Not selected:    No    I'm not sure   What color is your nasal drainage? Select one.    Yellow   Not selected:    Clear    White    Green    My nose is stuffed but not draining or running    I'm not sure   Is your nasal drainage thick or thin? Select one.    Thick   Not selected:    Thin    I'm not sure   Is there any drainage (mucus) going down the back of your throat? This kind of drainage is also called \"postnasal drip.\" Select one.    Yes   Not selected:    No    I'm not sure   Since your symptoms started, have you felt dizzy? Select one.    No   Not selected:    Yes, but I can continue with my regular daily activities    Yes, and it makes it hard to stand, walk, or do daily activities   Do you have chest pain? You might also feel it as discomfort, aching, tightness, or squeezing in the chest. Select one.    Yes   Not selected:    No   Which of these is true of your chest pain? Select one.    My chest hurts only when I cough   Not selected:    My chest hurts even when I'm not coughing   Have you urinated at least 3 times in the last 24 hours? Select one.    Yes   Not selected:    No    I'm not sure   Changes in alertness or awareness may mean you need emergency care. Since your symptoms started, have you had any of these? Select all that apply.    None of the above   Not selected:    Confusion    Slurred speech    Not knowing where you are or what day it is    Difficulty staying " conscious    Fainting or passing out   Do your symptoms include a whistling sound, or wheezing, when you breathe? Select one.    No   Not selected:    Yes    I'm not sure   Early in this interview, you told us you were hoarse or you'd lost your voice. How would you describe the changes to your voice? Select one.    It just sounds a little raspy   Not selected:    It's harder than usual to talk    I can barely talk at all   Is it possible that you strained your voice? Singing, yelling, or talking more or louder than usual can cause voice strain. Select one.    No   Not selected:    Yes    I'm not sure   Do you have any of these symptoms in your ear(s)? Select all that apply.    Fullness    Plugged or blocked sensation   Not selected:    Pain    Pressure    Crackling or popping    None of the above   Can you move your chin toward your chest?    Yes   Not selected:    No, my neck is too stiff   Are your glands/lymph nodes swollen, or does it hurt when you touch them?    Yes   Not selected:    No    I'm not sure   People with a very high body mass index (BMI) are at higher risk for developing complications from the flu and severe illness from COVID-19. To determine your BMI, we need to know your weight and height. Please enter your weight (in pounds).    Weight   Please enter your height.    Height   In the past week, has anyone around you (such as at school, work, or home) had a confirmed diagnosis of the flu? A confirmed diagnosis means that a nose swab was done to verify a flu infection. Select all that apply.    No   Not selected:    I live with someone who has the flu    I've been within touching distance of someone who has the flu    I've walked by, or sat about 3 feet away from, someone who has the flu    I've been in the same building as someone who has the flu    I'm not sure   Have you ever been diagnosed with asthma? Select one.    No   Not selected:    Yes   Have you ever been prescribed albuterol to use for  wheezing, cough, or shortness of breath caused by a cold, bronchitis, or pneumonia? Albuterol (ProAir, Proventil, Ventolin) is prescribed as an inhaler or a solution to be used with a nebulizer machine. Select one.    Yes   Not selected:    No    I'm not sure   Have you ever been prescribed a steroid inhaler to use for wheezing, cough, or shortness of breath caused by a cold, bronchitis, or pneumonia? Some examples of steroid inhalers include Pulmicort, Flovent, Qvar, and Alvesco. Select one.    Yes   Not selected:    No    I'm not sure   Have you used albuterol for your current symptoms? This includes both albuterol inhalers and albuterol solution in a nebulizer machine. Select one.    No, I don't have any, or it's    Not selected:    Yes    No, I don't feel like I need it   Would you like a prescription for albuterol? Select one.    Yes   Not selected:    No   What form of albuterol do you need? Select one.    Inhaler   Not selected:    Nebulizer solution   Have you ever been diagnosed with chronic obstructive pulmonary disease (COPD)? Select one.    No   Not selected:    Yes    I'm not sure   In the last year, how many times were you treated with antibiotics for a sinus infection? Select one.    None   Not selected:    1 to 3 times    4 or more times   Have you been diagnosed with a deviated septum or nasal polyps? The nose is divided into two nostrils by the septum. A crooked septum is called a deviated septum. Nasal polyps are growths inside the nose or sinuses. Select one.    No   Not selected:    Yes, but I had surgery to treat them    Yes, I have a deviated septum    Yes, I have nasal polyps    Yes, I have a deviated septum and nasal polyps    I'm not sure   Do you have a sore inside your nose that won't heal? Select one.    No   Not selected:    Yes    I'm not sure   Do you have allergies (pollen, dust mites, mold, animal dander)? Select one.    Yes   Not selected:    No    I'm not sure   What kind  of allergies do you have? Select all that apply.    Seasonal allergies (hay fever)    Pet allergies   Not selected:    Dust allergies    None of the above    I'm not sure   Do you think your symptoms could be allergy-related? Select one.    No   Not selected:    Yes    I'm not sure   Have you had a flu shot this season? Select one.    No   Not selected:    Yes, less than 2 weeks ago    Yes, 2 to 4 weeks ago    Yes, 1 to 3 months ago    Yes, 3 to 6 months ago    Yes, more than 6 months ago    I'm not sure   The flu and COVID-19 can be more serious for people with certain conditions or characteristics. These questions help us figure out if you or anyone you live with is at higher risk for complications from these infections. Do either of these statements apply to you? Select all that apply.    None of the above   Not selected:    I'm  or Native [place]    I'm a healthcare worker   Do you smoke tobacco? Select one.    No, I quit   Not selected:    Yes, every day    Yes, some days    No, never   Some conditions can put you at risk for more serious infections. Do any of these apply to you? Select all that apply.    None of the above   Not selected:    I've been hospitalized within the last 5 days    I have diabetes    I'm in close contact with a child in    Are you currently being treated for any of these conditions? Scroll to see all options. Select all that apply.    None of the above   Not selected:    Aspirin triad (also known as Samter's triad or ASA triad)    Asthma or hives from taking aspirin or other NSAIDs, such as ibuprofen or naproxen    Blockage or narrowing of the blood vessels of the heart    Blood dyscrasia, such anemia, leukemia, lymphoma, or myeloma    Bone marrow depression    Catecholamine-releasing paraganglioma    Blood clotting disorder    Congenital long QT syndrome    Depression    Difficulty urinating or completely emptying your bladder    Uncorrected electrolyte  abnormalities    Fungal infection    Gastrointestinal (GI) bleeding    Gastrointestinal (GI) obstruction    G6PD deficiency    Recent heart attack    High blood pressure    Irregular heartbeat or heart rhythm    Kidney disease or hemodialysis    Mononucleosis (mono)    Myasthenia gravis    Parkinson's disease    Pheochromocytoma    Reye syndrome    Seizure disorder    Ulcerative colitis   Do you have any of these conditions that can affect the immune system? Scroll to see all options. Select all that apply.    None of these   Not selected:    History of bone marrow transplant    Chronic kidney disease    Chronic liver disease (including cirrhosis)    HIV/AIDS    Inflammatory bowel disease (Crohn's disease or ulcerative colitis)    Lupus    Moderate to severe plaque psoriasis    Multiple sclerosis    Rheumatoid arthritis    Sickle cell anemia    Alpha or beta thalassemia    History of solid organ transplant (kidney, liver, or heart)    History of spleen removal    An autoimmune disorder not listed here    A condition requiring treatment with long-term use of oral steroids (such as prednisone, prednisolone, or dexamethasone)   Have you ever been diagnosed with cancer? Select one.    No   Not selected:    Yes, I have cancer now    Yes, but I'm in remission   Have you ever had either of these conditions? Select all that apply.    No   Not selected:    Metoclopramide-associated dystonic reaction    Tardive dyskinesia   Do any of these apply to the people who live with you? Select all that apply.    A child under the age of 5   Not selected:    An adult 65 or older    A person who is pregnant    A person who has given birth, had a miscarriage, had a pregnancy loss, or had an  in the last 2 weeks    An  or Native [place]    None of the above   Does any member of your household have any of these medical conditions? Select all that apply.    None of the above   Not selected:    Asthma    Disorders of  the brain, spinal cord, or nerves and muscles, such as dementia, cerebral palsy, epilepsy, muscular dystrophy, or developmental delay    Chronic lung disease, such as COPD or cystic fibrosis    Heart disease, such as congenital heart disease, congestive heart failure, or coronary artery disease    Cerebrovascular disease, such as stroke or another condition affecting the blood vessels or blood supply to the brain    Blood disorders, such as sickle cell disease    Diabetes    Metabolic disorders such as inherited metabolic disorders or mitochondrial disease    Kidney disorders    Liver disorders    Weakened immune system due to illness or medications such as chemotherapy or steroids    Children under the age of 19 who are on long-term aspirin therapy    Extreme obesity (BMI > 40)   Are you pregnant? Select one.    No   Not selected:    Yes   When was your last menstrual period? If you don't currently have periods or no longer have periods, please briefly explain.    Yes monthly and normal   Within the last 2 weeks, have you: - Given birth - Had a miscarriage - Had a pregnancy loss - Had an  Being postpartum (live birth or loss) within the last 2 weeks increases your risk of flu complications. Select one.    No   Not selected:    Yes   Are you breastfeeding? Select one.    Yes   Not selected:    No   Just a few more questions about medications, and then you're finished. Have you used any non-prescription medications or nasal sprays for your current symptoms? Examples include saline sprays, decongestants, NyQuil, and Tylenol. Select one.    Yes   Not selected:    No   Which of these non-prescription medications have you tried? Scroll to see all options. Select all that apply.    Guaifenesin (Mucinex)    Loratadine (Alavert, Claritin)   Not selected:    Acetaminophen (Tylenol)    Budesonide (Rhinocort)    Cetirizine (Zyrtec)    Chlorpheniramine (Aller-chlor, Chlor-Trimeton)    Cromolyn (NasalCrom)     Dextromethorphan (Delsym, Robitussin, Vicks DayQuil Cough)    Diphenhydramine (Benadryl)    Fexofenadine (Allegra)    Fluticasone (Flonase)    Guaifenesin/dextromethorphan (Delsym DM, Mucinex DM, Robitussin DM)    Ibuprofen (Advil, Motrin, Midol)    Ketotifen (Alaway, Zaditor)    Naphazoline-pheniramine (Naphcon-A, Opcon-A, Visine-A)    Omeprazole (Prilosec)    Oxymetazoline (Afrin)    Phenylephrine (Sudafed)    Triamcinolone (Nasacort)    None of the above   In the past month, have you taken antibiotics for similar symptoms? Examples of antibiotics include amoxicillin, amoxicillin-clavulanate (Augmentin), penicillin, cefdinir (Omnicef), doxycycline, and clindamycin (Cleocin). Select one.    No   Not selected:    Yes    I'm not sure   Have you taken any monoamine oxidase inhibitor (MAOI) medications in the last 14 days? Examples include rasagiline (Azilect), selegiline (Eldepryl, Zelapar), isocarboxazid (Marplan), phenelzine (Nardil), and tranylcypromine (Parnate). Select one.    No   Not selected:    Yes    I'm not sure   Do you take Kynmobi or Apokyn (apomorphine)? Select one.    No   Not selected:    Yes    I'm not sure   Are you taking any other medications or supplements? On the next screen, you need to list all vitamins, supplements, non-prescription medications (such as aspirin or Aleve), and prescription medications that you're taking. Select one.    No   Not selected:    Yes    Yes, but I'm not sure what they are   Have you ever had an allergic or bad reaction to any medication? Select one.    No   Not selected:    Yes   Are you allergic to milk or to the proteins found in milk (for example, whey or casein)? A milk allergy is different from lactose intolerance. Select one.    No   Not selected:    Yes    I'm not sure   Have you ever had jaundice or liver problems as a result of taking amoxicillin-clavulanate (Augmentin)? Jaundice is a condition in which the skin and the whites of the eyes turn yellow.  Select all that apply.    No, not that I know of   Not selected:    Yes, jaundice    Yes, liver problems   Have you ever had jaundice or liver problems as a result of taking azithromycin (Zithromax, Zmax)? Jaundice is a condition in which the skin and the whites of the eyes turn yellow. Select all that apply.    No, not that I know of   Not selected:    Yes, jaundice    Yes, liver problems   Do you need a doctor's note? A doctor's note confirms that you received care today and states when you can return to school or work. It does not contain information about your diagnosis or treatment plan. Your provider will make the final decision on whether to give you a doctor's note and for how long. Doctor's notes CANNOT be backdated. Select one.    No   Not selected:    Today only (1 day)    Today and tomorrow (2 days)    3 days    7 days    10 days    14 days   Is there anything else you'd like to tell us about your symptoms?    no   ----------   Medical history   The following information was received from the EMR on October 19, 2021.   Allergies:    EPINEPHRINE   - Allergy Type: medication   - Reaction: Other (See Comments)   - Severity: Mild   - Status: active   Medications:    IBUPROFEN 800 MG PO TABS   - Route: Oral   - Start Date: January 09, 2020   - End Date: None   - Status: active    CLASSIC PRENATAL 28-0.8 MG PO TABS   - Route: Oral   - Start Date: January 07, 2020   - End Date: None   - Status: active   Problem list:    Pregnancy   - Category: Problem List Item   - Health Status:   - Start Date: January 07, 2020   - End Date: None   - Status: active

## 2021-10-19 NOTE — EXTERNAL PATIENT INSTRUCTIONS
Note   Continue Mucinex with lot of fluids for clear thin secretions. Probiotics advised to replace the good bacteria that an antibiotic will kill off. Hope that you are feeling better soon.   Diagnosis   Bacterial sinusitis   My name is Nava Vela, and I'm a healthcare provider at [facility]. I'm sorry you're not feeling well. I reviewed your interview, and I see that you have bacterial sinusitis.   To prevent the spread of illness to others, I recommend that you stay home and away from other people as much as possible while you're sick.   Medications   Your pharmacy   [facility] [id number] [id number] [phone]     Prescription      Albuterol sulfate HFA (90mcg/puff): Inhale 2 puffs every 6 hours as needed for wheezing. If symptoms are severe, may use as often as every 4 hours.      Mucus Relief ER oral tablet, extended release (600mg): Take 1 tablet by mouth every 12 hours as needed for cough and congestion.      Amoxicillin (500mg): Take 1 capsule by mouth 3 times a day for 7 days for infection. This medication is an antibiotic. Take it exactly as directed. You must finish the entire course of medication, even if you feel better after the first few days of treatment.    Start taking the antibiotics I've prescribed right away. You need to finish the entire course of antibiotics, even if you start to feel better before the pills run out.   Some women develop yeast infections after taking antibiotics. If you develop a yeast infection, you can treat it with antifungal creams or suppositories. These are available without a prescription at [employer] and many supermarkets.   Pregnancy and breastfeeding   This treatment plan is safe to follow while you're breastfeeding.   About your diagnosis   The sinuses are hollow spaces connected to the nasal passages. Sinusitis occurs when the sinuses swell and block the drainage of fluid and mucus from the nose, causing pain, pressure,  and congestion. Fatigue, difficulty sleeping, or decreased appetite may accompany your symptoms.   More than 90% of sinus infections are caused by viruses. However, in certain cases, a sinus infection may be caused by bacteria. Bacterial sinus infections usually look like one of the following cases:    Severe sinus symptoms with a fever over 102F.    Sinus symptoms that have not improved at all after 10 days.    Cold symptoms that slowly improve but then worsen again after 5 or 6 days, usually with a high fever, headache, or nasal discharge.   What to expect   If you follow this treatment plan, you should start to feel better within a few days.   You can return to your normal activities when ALL of the following are true:    You've been fever-free for more than 24 hours without using fever-reducing medications such as Tylenol    Your other symptoms have improved    It's been at least 10 days since your symptoms first started   When to seek care   Call us at [phone]   with any sudden or unexpected symptoms.    Symptoms that last longer than 10 to 14 days.    Symptoms that get better for a few days, and then suddenly get worse.    Fever that measures over 103F or continues for more than 3 days.    Any vision changes.    A worsening headache.    Stiff neck.    Swelling of your forehead or eyes.    Coughing up red or bloody mucus.    Swallowing becomes extremely difficult or impossible.    More than 5 episodes of diarrhea in a day.    More than 5 episodes of vomiting in a day.    Severe shortness of breath.   You mentioned having chest pain. If you develop any of the following, call 911 or go immediately to your nearest emergency room:    A feeling of pressure on your chest    Pain that gets worse with physical activity    Pain that feels like it is radiating to the shoulders, neck, arm, or jaw   Other treatment    Rest! Your body needs rest to recover and fight infection.    Drink plenty of water to stay  hydrated.    Use steam to soothe your sinuses: Breathe it in from a shower or a bowl of hot water. Placing a warm, moist washcloth over your nose and forehead may help relieve the sinus pain and pressure.    Try non-prescription saline nasal sprays to help your nasal symptoms. Try using a Neti Pot to flush out your stuffy nose and sinuses. Neti Pots are available at any drugstore without a prescription.    Avoid smoke and air pollution. Smoke can make infections worse.   Prevention    Avoid close contact with other people when you're sick.    Cover your mouth and nose when you cough or sneeze. Use a tissue or cough into your elbow. Make sure that used tissues go directly into the trash.    Avoid touching your eyes, nose, or mouth while you're sick.    Wash your hands often, especially after coughing, sneezing, or blowing your nose. If soap and water are not available, use an alcohol-based hand .    If you or someone in your home or workplace is sick, disinfect commonly used items. This includes door handles, tables, computers, remotes, and pens.    Coronavirus (COVID-19) information   Common symptoms of COVID-19 include fever, cough, shortness of breath, fatigue, muscle or body aches, headaches, new loss of sense of taste or smell, sore throat, stuffy or runny nose, nausea or vomiting, and diarrhea. Most people who get COVID-19 have mild symptoms and can rest at home until they get better. Elderly people and those with chronic medical problems may be at risk for more serious complications.   FAQs about the COVID-19 vaccine   There are three authorized COVID-19 vaccines: Miguel & Miguel's Doris Vaccine (J&J/Doris), Moderna, and Pfizer-eventuosity (Pfizer). The J&J/Doris and Moderna vaccines are approved for use in people aged 18 and older. The Pfizer vaccine is approved for those aged 12 and older. All three are available at no cost.   Which vaccine is the best? Which vaccine should I get?   All three  vaccines are highly effective. Even if you get COVID after being vaccinated, all of the vaccines help prevent severe disease, hospitalization, and complications.   Most people should get whichever vaccine is first available to them. However, women younger than 50 years old should consider the rare risk of blood clots with low platelets after vaccination with the J&J/Doris vaccine. This risk hasn't been seen with the other two vaccines.   Are the vaccines safe?   Yes. Hundreds of millions of people in the [place] have already safely received COVID-19 vaccines. As part of Phase 3 clinical trials in the [place] and other countries, researchers collected safety and efficacy data for all three vaccines. These clinical trials follow strict standards. Before a vaccine is approved, the manufacturing company must submit data to the Food and Drug Administration (FDA) for review. Tens of thousands of volunteers participated in the clinical trials for the vaccines. The FDA continues to monitor safety data as the vaccines are given to the general population.   Do I need the vaccine if I've already had COVID?   Yes. If you've recently had COVID-19, you can choose to wait 90 days after your illness to get vaccinated. It's uncommon to get reinfected with COVID-19 within 90 days after an infection. If you currently have COVID-19, you should wait to get vaccinated until you feel better and your isolation period is finished. If you've recently been exposed to COVID-19, you should wait to get the vaccine until after your quarantine period.   How many doses of the vaccine do I need?   J&J/Doris: one dose.   Moderna: two doses, spaced 4 weeks apart.   Pfizer vaccine: two doses, spaced 3 weeks apart.   When am I considered fully vaccinated?   J&J/Doris: 14 days after you get the shot.   Moderna: 14 days after your second dose.   Pfizer: 14 days after your second dose.   What if I miss the second dose of the Moderna or Pfizer vaccine?    Contact your healthcare provider to discuss your options. While one dose of the vaccine may provide some protection against COVID, you need both doses for maximum protection.   What are the common side effects of the vaccine?   A sore arm, tiredness, headache, and muscle pain may occur within two days of getting the vaccine and last a day or two. For the Moderna or Pfizer vaccines, side effects are more common after the second dose. People over the age of 55 are less likely to have side effects than younger people.   After I'm fully vaccinated, can I still get or spread COVID?   Yes, but your disease should be milder, and your risk of serious illness, hospitalization, and complications will be much lower. And being vaccinated reduces the risk of spreading the disease if you get it.   After I'm fully vaccinated, can I go back to normal?    You should still wear a mask indoors in public if:    It's required by laws, rules, regulations, or local guidance.    You have a weakened immune system.    Your age puts you at increased risk of severe disease.    You have a medical condition that puts you at increased risk of severe disease.    Someone in your household has a weakened immune system, is at increased risk for severe disease, or is unvaccinated.    You're in an area of high transmission.    For travel information, see the CDC's latest guidance  .    Even after you're fully vaccinated, you should still:    Get tested and stay away from others if you develop symptoms of COVID-19.    Stay home and away from other private or public settings if you've tested positive for COVID-19 in the previous 10 days.    Continue to follow any applicable laws, rules, and regulations.    If you're exposed to COVID-19 after being fully vaccinated, you should get tested 3 to 5 days after exposure, even if you don't have symptoms. Wear a mask indoors in public for 14 days following exposure, or until you've confirmed your test result is  "negative. If you test positive for COVID-19, you should isolate at home for 10 days.   I'm fully vaccinated but have heard about a \"third dose\" and \"boosters.\" Do these apply to me?   If you got the J&J/Doris vaccine, these don't currently apply to you.   Third dose (for immunocompromised people):   If you have a moderately to severely weakened immune system and have had two doses of the Moderna or Pfizer vaccine, you should get a third dose. This is because your immune system may not have responded well enough to the first two doses.   If any of these situations apply, you have a moderately to severely weakened immune system:    You're getting active cancer treatment for a cancer or tumor of the blood    You've had an organ transplant and are taking medicine to suppress your immune system    You've had a stem cell transplant within the last 2 years    You're taking medicine to suppress your immune system, such as high-dose corticosteroids    You have moderate to severe primary immunodeficiency (such as DiGeorge syndrome, Wiskott-Guera syndrome)    You have advanced or untreated HIV infection   Booster shot (a \"top-up\" for people whose immunity from vaccination may have lessened over time):   If you got the Moderna vaccine, this doesn't currently apply to you.   If you got the Pfizer vaccine AND it's been at least 6 months since your second dose, you SHOULD get a booster shot if:    You live in a long-term care facility    You're 65 or older    You're 50 to 64 and have an underlying condition, such as:    Cancer    Chronic kidney disease    Chronic lung disease (COPD, moderate to severe asthma, interstitial lung disease, cystic fibrosis, or pulmonary hypertension)    Dementia or other neurological condition    Diabetes    Down syndrome    Heart condition, including heart failure, coronary artery disease, cardiomyopathies, or hypertension    HIV infection    A weakened immune system    Chronic liver disease   "  Obesity    Pregnancy    Sickle cell disease or thalassemia    Smoking, current or former    Solid organ or blood stem cell transplant    Stroke or cerebrovascular disease    Substance use disorder   If you got the Pfizer vaccine AND it has been at least 6 months since your second dose, you MAY need a booster shot if:    You're 18 to 64 and your job or living situation puts you at increased risk of COVID-19 exposure and transmission    You're 18 to 49 and have an underlying condition, such as:    Cancer    Chronic kidney disease    Chronic lung disease (COPD, moderate to severe asthma, interstitial lung disease, cystic fibrosis, or pulmonary hypertension)    Dementia or other neurological condition    Diabetes    Down syndrome    Heart condition, including heart failure, coronary artery disease, cardiomyopathies, or hypertension    HIV infection    A weakened immune system    Chronic liver disease    Obesity    Pregnancy    Sickle cell disease or thalassemia    Smoking, current or former    Solid organ or blood stem cell transplant    Stroke or cerebrovascular disease    Substance use disorder   If you think you need a booster shot, speak with your care team.   General information about COVID-19   What should I do if I'm exposed to someone with COVID-19?   In general, you need to be in close contact with someone who has COVID-19 to get infected. Close contact means:    Living in the same household as someone with COVID-19.    Caring for someone with COVID-19.    Being within 6 feet of someone with COVID-19 for a total of at least 15 minutes over a 24-hour period.    Being in direct contact with respiratory droplets from someone with COVID-19 (for example, being coughed on, kissing, or sharing utensils).   If you're exposed and not fully vaccinated:    Self-quarantine in your home for 14 days after your last known contact with the infected person. Because you can spread the disease before you have symptoms, it's very  important that you stay home AT ALL TIMES, unless you need medical care. Don't go to work, school, or public places, including grocery stores and pharmacies. Avoid public transportation, ride-sharing, and taxis.    Watch for the common symptoms of COVID-19: fever, cough, shortness of breath, fatigue, muscle or body aches, headache, new loss of sense of taste or smell, sore throat, stuffy or runny nose, nausea or vomiting, and diarrhea.   What if I develop symptoms of COVID-19?   CALL your healthcare provider or clinic right away to discuss next steps if you have any of the following risk factors:    Age 65 or older    Pregnant    Chronic medical condition such as diabetes, liver disease, kidney disease requiring dialysis, heart disease, high blood pressure, severe obesity, or lung disease (including moderate to severe asthma)    A medical condition that affects your immune system    Taking a medication that affects your immune system   Otherwise, if your symptoms are mild, you don't need to call your healthcare provider or be seen for an exam. You can recover at home and should feel better within a few weeks. Because COVID-19 is highly contagious, it's important that you avoid close contact with others while you're recovering. This means staying home AT ALL TIMES, unless you need medical care. Don't go to work, school, or public places, including grocery stores and pharmacies. Avoid public transportation, ride-sharing, and taxis.   There are currently no specific medications to treat this infection. Over-the-counter cold medications can help ease symptoms.   To prevent the spread of COVID-19 to the people and animals in your household:    Stay in a specific room away from other people in your home, and use a separate bathroom if possible.    Wear a mask when close contact with household members can't be avoided.   You can return to your normal activities when ALL of the following are true:    Your symptoms have  improved    It's been at least 10 days since your symptoms first started    You've been fever-free for more than 24 hours without using fever-reducing medications such as Tylenol   When to get care   Call your healthcare provider immediately if you have any of the following:    Fever over 103F    Fever that doesn't come down after taking medications such as Tylenol or ibuprofen    Fever that returns after being gone for more than 24 hours    Fever lasting more than 4 days    Worsening shortness of breath or difficulty breathing   Go to your nearest ER or call 911 if you have any of the following:    Shortness of breath that makes it hard to do simple things like get dressed, bathe, or comb your hair    Persistent chest pain or chest tightness    New confusion or difficulty staying alert    Bluish color to the lips or face    Flu vaccine information   Getting a flu vaccine this year is more important than ever. The vaccine not only protects you and the people around you from the flu, it also helps reduce the strain on healthcare systems responding to the COVID-19 pandemic.   Flu shots usually become available in early September.   Who should get a flu vaccine?   Everyone 6 months of age and older should get a yearly flu vaccine.   When should I get vaccinated?   You should get a flu vaccine by the end of October. Once you're vaccinated, it takes about two weeks for antibodies to develop and protect you against the flu. That's why it's important to get vaccinated as soon as possible.   After October, is it too late to get vaccinated?   No. You should still get vaccinated. As long as the flu viruses are still in your community, flu vaccines will remain available, even into January of next year or later.   Why do I need a flu vaccine EVERY year?   Flu viruses are constantly changing, so flu vaccines are usually updated from one season to the next. Your protection from the flu vaccine also lessens over time.   Is the flu  vaccine safe?   Yes. Over the last 50 years, hundreds of millions of Americans have safely received the flu vaccines.   What are the side effects of flu vaccines?   You CANNOT get the flu from a flu vaccine. Common side effects of the flu shot include soreness, redness and/or swelling where the shot was given, low grade fever, and aches. Common side effects of the nasal spray flu vaccine for adults include runny nose, headaches, sore throat, and cough. For children, side effects include wheezing, vomiting, muscle aches, and fever.   Does the flu vaccine increase your risk of getting COVID-19?   No. There is no evidence that getting a flu vaccine increases your risk of getting COVID-19.   Is it safe to get the flu vaccine along with a COVID-19 vaccine?   Yes. It's safe to get the flu vaccine with a COVID vaccine or booster.   Contact your healthcare provider TODAY for details on when and where to get your flu vaccine.   Your provider   Your diagnosis was provided by Nava Vela, a member of your trusted care team at [facility].   If you have any questions, call us at [phone]  .

## 2023-05-08 ENCOUNTER — E-VISIT (OUTPATIENT)
Dept: FAMILY MEDICINE CLINIC | Facility: TELEHEALTH | Age: 33
End: 2023-05-08
Payer: COMMERCIAL

## 2023-05-08 RX ORDER — AMOXICILLIN 875 MG/1
875 TABLET, COATED ORAL 2 TIMES DAILY
Qty: 20 TABLET | Refills: 0 | Status: SHIPPED | OUTPATIENT
Start: 2023-05-08 | End: 2023-05-18

## 2023-05-08 NOTE — EXTERNAL PATIENT INSTRUCTIONS
Diagnosis   Viral sinusitis   My name is BERRY Sutton, and I'm a healthcare provider at [facility]. From your interview, I see that you have viral sinusitis (sinus infection).   To prevent the spread of illness to others, I recommend that you stay home and away from other people as much as possible while you're sick. When you need to be around other people, consider wearing a face mask.   Here are the main things to know about your current symptoms:    A viral sinusitis infection will get better on its own.    You can use the medications I recommend here to help ease your symptoms.   Based on what you told me in your interview, I haven't prescribed any antibiotics. Antibiotics fight bacteria, not viruses. The latest research shows that 90% of sinus infections are caused by viruses. Antibiotics won't help with your viral infection. They could even make you feel worse as they can cause or worsen nausea, diarrhea, and stomach pain. The following factors suggest that a virus is causing your symptoms:    You've had symptoms for less than 10 days. A bacterial infection is suspected when you've had symptoms longer than 10 days without improvement.    You haven't had a fever. Bacterial infections can cause a high fever.    In addition to your sore throat, you have other cold symptoms like a stuffy nose or cough. This suggests a viral infection, rather than a bacterial infection such as strep throat.   Medications   Your pharmacy   [facility]/pharmacy #[id number] [id number] [phone]     Prescription   Albuterol sulfate HFA (90mcg/puff): Inhale 2 puffs every 6 hours as needed for wheezing. If symptoms are severe, may use as often as every 4 hours.   Benzonatate (200mg): Take 1 capsule by mouth 3 times a day as needed for cough. Do not chew or cut these capsules.   Non-prescription   Pseudoephedrine (60mg): Take 1 tablet by mouth every 6 hours as needed for nasal congestion for up to 5  days. Do not exceed 240mg (4 pills) in a 24-hour period.    I've recommended a decongestant (pseudoephedrine) for your congestion. Although this is an over-the-counter medication, it's kept behind the pharmacist's counter. Ask the pharmacist for pseudoephedrine for your congestion.   About your diagnosis   Viral sinusitis is an infection of the sinuses, the hollow spaces connected to your nasal passages. These passages swell and block the drainage of fluid or mucus from the nose, causing pain, pressure, and congestion. These are the key things to know about a viral sinus infection:    It usually starts with cold symptoms.    Some medications can lessen your symptoms.    You can spread it to other people.    The 200 different viruses that cause the common cold can also cause a viral sinus infection.   Common symptoms include fever, sneezing, runny nose, congestion, sore throat, and cough. You may also notice fatigue, body aches, difficulty sleeping, or decreased appetite.   What to expect   You should feel better within 1 to 2 weeks.   When to seek care   Call us at [phone]   with any sudden or unexpected symptoms.    Fever that measures over 103F or continues for more than 3 days.    A worsening headache.    Swallowing becomes extremely difficult or impossible.    Hoarse voice or loss of voice lasting longer than 2 weeks.    Your sinus pain continues for 10 days or more, without improvement.    More than 5 episodes of diarrhea in a day.    More than 5 episodes of vomiting in a day.    Coughing up red or bloody mucus.    Severe shortness of breath.    Severe stomach pain.    Symptoms that get better for a few days, and then suddenly get worse.   You mentioned having chest pain. If you develop any of the following, call 911 or go immediately to your nearest emergency room:    A feeling of pressure on your chest    Pain that gets worse with physical activity    Pain that feels like it is radiating to the  shoulder, neck, arm, or jaw   Other treatment    Rest! Your body needs rest to recover and fight infection.    Drink plenty of water to stay hydrated.    Use a clean humidifier or a cool-mist vaporizer in your room at night. Breathing humid air may help you feel better.    Place a warm, moist washcloth over your nose and forehead to help with your sinus pain and pressure.    Try non-prescription saline nasal sprays to help your nasal symptoms.    Try using a Neti Pot to flush out your stuffy nose and sinuses. [facility] Pots are available at any [facility] without a prescription.    Gargle with salt water several times a day to help your throat feel better. Cough drops and throat lozenges may provide extra relief. A teaspoon of honey stirred into warm water or weak tea can help soothe a sore throat and cough.    Avoid smoke and air pollution. Smoke can make infections worse.   Prevention    Avoid close contact with other people when you're sick.    Cover your mouth and nose when you cough or sneeze. Use a tissue or cough into your elbow. Make sure that used tissues go directly into the trash.    Avoid touching your eyes, nose, or mouth while you're sick.    Wash your hands often, especially after coughing, sneezing, or blowing your nose. If soap and water are not available, use an alcohol-based hand .    If you or someone in your home or workplace is sick, disinfect commonly used items. This includes door handles, tables, computers, remotes, and pens.    Coronavirus (COVID-19) information   Common symptoms of COVID-19 include fever, cough, shortness of breath, fatigue, muscle or body aches, headaches, new loss of sense of taste or smell, sore throat, stuffy or runny nose, nausea or vomiting, and diarrhea. Most people who get COVID-19 have mild symptoms and can rest at home until they get better. Elderly people and those with chronic medical problems may be at risk for more serious complications.   FAQs about the  COVID-19 vaccine   There are four authorized COVID-19 vaccines: Miguel & Miguel's Doris Vaccine (J&J/Doris), Moderna, Novavax, and Pfizer-BioNTech (Pfizer). The J&J/Doris and Novavax vaccines are approved for use in people aged 18 and older. The Moderna and Pfizer vaccines are approved for those aged 6 months and older. All four are available at no cost. Even if you don't have health insurance, you can still get the COVID-19 vaccine for free.   Which vaccine is the best? Which vaccine should I get?   All four vaccines are highly effective. Even if you get COVID-19 after being vaccinated, all of the vaccines help prevent severe disease, hospitalization, and complications.   Most people should get whichever vaccine is first available to them. However, women younger than 50 years old should consider the rare risk of blood clots with low platelets after vaccination with the J&J/Doris vaccine. This risk hasn't been seen with the other three vaccines.   Are the vaccines safe?   Yes. Hundreds of millions of people in the [place] have already safely received COVID-19 vaccines under the most intense safety monitoring in the history of the [place].   Do I need the vaccine if I've already had COVID?   Yes. Vaccination helps protect you even if you've already had COVID.   If you had COVID-19 and had symptoms, wait to get vaccinated until you've recovered and completed your isolation period.   If you tested positive for COVID-19 but did not have symptoms, you can get vaccinated after 5 full days have passed since you had a positive test, as long as you don't develop symptoms.   How many doses of the vaccine do I need?   Visit www.cdc.gov/coronavirus/2019-ncov/vaccines/stay-up-to-date.html   to find out how to stay up to date with your COVID-19 vaccines.   I'm immunocompromised. How many doses of the vaccine do I need?   For information on how immunocompromised people can stay up to date with their COVID-19 vaccines, visit  www.cdc.gov/coronavirus/2019-ncov/vaccines/recommendations/immuno.html  .   What are the common side effects of the vaccine?   A sore arm, tiredness, headache, and muscle pain may occur within two days of getting the vaccine and last a day or two. For the Moderna or Pfizer vaccines, side effects are more common after the second dose. People over the age of 55 are less likely to have side effects than younger people.   After I'm up to date on vaccines, can I still get or spread COVID?   Yes, you can still get COVID, but your disease should be milder. And your risk of serious illness, hospitalization, and complications will be much lower, especially if you're up to date. Unfortunately, you can still spread COVID if you've been vaccinated. That's why it's important to follow isolation guidelines if you get sick or test positive.   After I'm up to date on vaccines, can I go back to normal?   You should still wear a mask indoors in public if:    It's required by laws, rules, regulations, or local guidance.    You have a weakened immune system.    Your age puts you at increased risk of severe disease.    You have a medical condition that puts you at increased risk of severe disease.    Someone in your household has a weakened immune system, is at increased risk for severe disease, or is unvaccinated.    You're in an area of high transmission.   Where can I get a COVID-19 vaccine?   Visit [facility]'s website for more information. To find a COVID-19 vaccination site near you, visit www.vaccines.gov/  , call [phone]  , or text your zip code to [id number] ([place]). Message and data rates may apply.   What about travel?   Travel increases your risk of exposure to COVID-19. For more information, see www.cdc.gov/coronavirus/2019-ncov/travelers/index.html  .   I've had close contact with someone who has COVID. Do I need to quarantine, and if so, for how long?   For the most current answer, including a calculator to  determine whether you need to stay home and for how long, visit www.cdc.gov/coronavirus/2019-ncov/your-health/quarantine-isolation.html  .   I've tested positive for COVID. How long do I need to isolate?   For the latest recommendations, including a calculator to determine how long you need to stay home, visit www.cdc.gov/coronavirus/2019-ncov/your-health/quarantine-isolation.html  .   What if I develop symptoms that might be from COVID?   For the latest recommendations on what to do if you're sick, including when to seek emergency care, visit www.cdc.gov/coronavirus/2019-ncov/if-you-are-sick/steps-when-sick.html  .    Flu vaccine information   Who should get a flu vaccine?   Everyone 6 months of age and older should get a yearly flu vaccine.   When should I get vaccinated?   You should get a flu vaccine by the end of October. Once you're vaccinated, it takes about two weeks for antibodies to develop and protect you against the flu. That's why it's important to get vaccinated as soon as possible.   After October, is it too late to get vaccinated?   No. You should still get vaccinated. As long as the flu viruses are still in your community, flu vaccines will remain available, even into January of next year or later.   Why do I need a flu vaccine EVERY year?   Flu viruses are constantly changing, so flu vaccines are usually updated from one season to the next. Your protection from the flu vaccine also lessens over time.   Is the flu vaccine safe?   Yes. Over the last 50 years, hundreds of millions of Americans have safely received the flu vaccines.   What are the side effects of flu vaccines?   You CANNOT get the flu from a flu vaccine. Common side effects of the flu shot include soreness, redness and/or swelling where the shot was given, low grade fever, and aches. Common side effects of the nasal spray flu vaccine for adults include runny nose, headaches, sore throat, and cough. For children, side effects include  wheezing, vomiting, muscle aches, and fever.   Does the flu vaccine increase your risk of getting COVID-19?   No. There is no evidence that getting a flu vaccine increases your risk of getting COVID-19.   Is it safe to get the flu vaccine along with a COVID-19 vaccine?   Yes. It's safe to get the flu vaccine with a COVID-19 vaccine or booster.   Contact your healthcare provider TODAY for details on when and where to get your flu vaccine.   Your provider   Your diagnosis was provided by BERRY Sutton, a member of your trusted care team at [facility].   If you have any questions, call us at [phone]  .

## 2023-05-08 NOTE — E-VISIT TREATED
Chief Complaint: Cold, flu, COVID, sinus, hay fever, or seasonal allergies   Patient introduction   Patient is 32-year-old female with cough, congestion, nasal discharge, sore throat, and fatigue that started 6 to 9 days ago. Regarding date of symptom onset, patient writes: 5/3/2023.   COVID-19 testing history, vaccination status, and exposure:    Has not been tested for COVID-19 since symptom onset.    Patient was prompted to take a self-test during the interview, but does not have a COVID-19 test kit.    Received 3 doses of the COVID-19 vaccine (Pfizer, Pfizer, Pfizer). Received their most recent dose of the vaccine more than 14 days ago.    No known exposure to a person with a confirmed or suspected case of COVID-19.    No travel outside local community within the last 14 days.   Risk factors for severe disease from COVID-19 infection    Healthcare worker.   Patient did not request an excuse note.   General presentation   No improvement of symptoms. Symptoms came on suddenly.   Fever:    No fever.   Sinus and nasal symptoms:    Nasal discharge.    Green nasal drainage.    Nasal drainage is thick.    Postnasal drip.    1 to 3 episodes of antibiotic treatment for sinus infection in the last year.    Sinus pain or pressure on or around the eyes, nose, and cheeks.    Patient first noticed sinus pain 5 to 9 days ago.    Sinus pain is worse with Valsalva.    No itchy nose or sneezing.    No history of unhealed nasal septal ulcer/nasal wound.    No history of deviated septum or nasal polyps.   Throat symptoms:    Sore throat.    No known recent strep exposure.    Patient does not think they have strep.    Patient can swallow liquids and solid foods with ease.   Head and body aches:    Fatigue.    No headache.    No sweats.    No chills.    No myalgia.   Cough:    Cough is worse in the morning.    Cough is productive of sputum.    Describes color of sputum as green.   Wheezing and shortness of breath:    Has  previously used an albuterol inhaler for URI, bronchitis, or pneumonia.    Using albuterol for current symptoms.    Using albuterol every 4 to 6 hours.    Patient requests a prescription of albuterol in the form of an inhaler.    No COPD diagnosis.    No asthma diagnosis.    No wheezing.    No shortness of breath.    No previous steroid inhaler use for URIs, bronchitis, or pneumonia.   Chest pain:    Has chest pain, but only when coughing.    Chest pain is not the patient's chief complaint.    [place] Heart Score (MHS): 0, low risk of CAD. Assigning 1 point to each of 5 criteria (female >= 65 years old or male >= 55 years, known CAD, pain worse with exercise, pain not reproducible with palpation, and patient assumes pain is coming from their heart), the MHS is a validated clinical decision rule used to rule out coronary artery disease in primary care patients with chest pain.   Ear symptoms:    Current symptoms include a plugged or blocked sensation in the ear(s).   Dizziness:    No dizziness.   Allergies:    Patient has known seasonal allergies and known pet allergies.    Patient does not think symptoms are allergy-related.   Flu exposure:    No recent known exposure to a person with a confirmed flu diagnosis.    Has not had a flu vaccine this season.   Patient is taking over-the-counter medications for current symptoms, including guaifenesin and ibuprofen.   Review of red flags/alarm symptoms:    No changes in alertness or awareness.    No nuchal rigidity.    No symptoms suggesting respiratory distress.    No symptoms suggesting airway obstruction.    No decreased urination.   Pregnancy/menstrual status/breastfeeding:    Not pregnant.    Not breastfeeding.    Regarding date of last menstrual period, patient writes: April 26,2023.   Self-exam:    Height: 167 centimeters    Weight: 60.3 kilograms    No difficulty moving their chin toward their chest.    Tonsillar edema.    Tonsils appear normal.    No palatal  petechiae.    Neck lymph nodes feel normal.   Recent antibiotic use:    Has not taken antibiotics for similar symptoms within the past month.   Current medications   Not taking other medications or supplements.   Medication allergies   None.   Medication contraindication review   No history of anaphylactic reaction to beta-lactam antibiotics; aspirin triad; blood dyscrasia; bone marrow depression; catecholamine-releasing paraganglioma; coronary artery disease; coagulation disorder; congenital long QT syndrome; depression; electrolyte abnormalities; fungal infection; GI bleeding; GI obstruction; G6PD deficiency; heart arrhythmia; hypertension; mononucleosis; myasthenia; recent myocardial infarction; NSAID-induced asthma/urticaria; Parkinson's disease; pheochromocytoma; porphyria; Reye syndrome; seizure disorder; ulcerative colitis; and urinary retention.   No history of metoclopramide-associated dystonic reaction and tardive dyskinesia.   No known history of amoxicillin-clavulanate-associated cholestatic jaundice or hepatic impairment.   No known history of azithromycin-associated cholestatic jaundice or hepatic impairment.   Past medical history   Immune conditions: No immunocompromising conditions.   No history of cancer.   Social history   Never smoked tobacco.   Assessment   Viral sinusitis.   This is the likely diagnosis based on patient's interview responses, including:    Symptom profile    Duration of symptoms is shorter than 10 days   Plan   Medications:    albuterol sulfate HFA 90 mcg/actuation aerosol inhaler RX 90mcg/puff 2 puffs inhaled q6h PRN 10d for wheezing. If symptoms are severe, may use as often as every 4 hours. Amount is 18 g.    benzonatate 200 mg capsule RX 200mg 1 cap PO tid PRN 7d for cough. Do not chew or cut these capsules. Amount is 21 cap.    pseudoephedrine 60mg tablet OTC 60mg 1 tab PO q6h PRN 5d for nasal congestion. Do not exceed 240mg (4 pills) in a 24-hour period. Amount is 20  tab.   The patient's prescriptions will be sent to:   [id number]   [id number] [facility] [address]   Phone: [phone]     Fax: [phone]   Patient informed to purchase OTC medication.   Education:    Condition and causes    Prevention    Treatment and self-care    When to call provider   ----------   Electronically signed by BERRY Sutton on 2023-05-08 at 11:08AM   ----------   Patient Interview Transcript:   Please carefully consider each question and answer as best you can. This helps your provider give you the best care. Which of these symptoms are bothering you? Select all that apply.    Cough    Stuffed-up nose or sinuses    Runny nose    Sore throat    Fatigue or tiredness   Not selected:    Shortness of breath    Itchy or watery eyes    Itchy nose or sneezing    Loss of smell or taste    Hoarse voice or loss of voice    Headache    Fever    Sweats    Chills    Muscle or body aches    Nausea or vomiting    Diarrhea    I don't have any of these symptoms   When did your current symptoms start? Select one.    6 to 9 days ago   Not selected:    Less than 48 hours ago    3 to 5 days ago    10 to 14 days ago    2 to 3 weeks ago    3 to 4 weeks ago    More than a month ago   Do you know the exact date your symptoms started? If so, enter the date as MM/DD/YY. Select one.    Yes (specify): 5/3/2023   Not selected:    No   Did your symptoms come on suddenly or gradually? Select one.    Suddenly   Not selected:    Gradually    I'm not sure   Have your symptoms improved at all since they began? Select one.    No   Not selected:    Yes, but they haven't gone away completely    Yes, but then they came back worse than before   Since your current symptoms started, have you had a viral test for COVID-19? - This includes home self-tests as well as nose swab or saliva tests done at a doctor's office, lab, or testing site. - It does NOT include antibody tests, which use a blood sample to test for  "past infection. Select one.    No   Not selected:    Yes   Taking a home COVID test can help your provider give you the best care. If you have a COVID test kit, take the test now before continuing with this interview. Do you have a COVID test kit? Select one.    No, I don't have a test kit   Not selected:    Yes, and I'll take a test now    Yes, but I prefer not to take a test now   Have you gotten the COVID-19 vaccine? Select one.    Yes   Not selected:    No   How many total doses of the COVID-19 vaccine have you gotten? This includes boosters as well as additional doses for those who are immunocompromised. Select one.    3 doses   Not selected:    1 dose    2 doses    4 doses    5 doses   1st dose    Pfizer   Not selected:    J&J/Doris    Moderna    Novavax   2nd dose    Pfizer   Not selected:    J&J/Doris    Moderna    Novavax   3rd dose    Pfizer   Not selected:    J&J/Doris    Moderna    Novavax   When did you get your most recent dose of the COVID-19 vaccine?    More than 14 days ago   Not selected:    Less than 48 hours (2 days) ago    48 to 72 hours (3 days) ago    3 to 5 days ago    5 to 7 days ago    7 to 14 days ago   In the last 14 days, have you traveled outside of your local community? This includes travel by car, RV, bus, train, or plane. Travel increases your chances of getting and spreading COVID-19. Select one.    No   Not selected:    Yes   In the last 14 days, have you had close contact with someone who has coronavirus (COVID-19)? \"Close contact\" means any of these: - Living in the same household as someone with COVID-19. - Caring for someone with COVID-19. - Being within 6 feet of someone with COVID-19 for a total of at least 15 minutes over a 24-hour period. For example, three 5-minute exposures for a total of 15 minutes. - Being in direct contact with respiratory droplets from someone with COVID-19 (being coughed on, kissing, sharing utensils). Select one.    No, not that I know of   " "Not selected:    Yes, a confirmed case    Yes, a suspected case   Do you cough so hard that it's made you gag or vomit? By gag, we mean has your coughing made you choke or dry heave? Select all that apply.    No   Not selected:    Yes, my coughing has made me gag    Yes, my coughing has made me vomit   When is your cough the worst? Select all that apply.    In the morning, or when I wake up   Not selected:    During the day    At nighttime, or while I'm sleeping    I haven't noticed a difference depending on time of day   Are you coughing up mucus or phlegm? Select one.    Yes, a lot   Not selected:    No, my cough is dry    Yes, a little   What color is most of the mucus or phlegm that you're coughing up? Select one.    Green or greenish   Not selected:    Clear    White/frothy    Yellow or yellowish    Red or pink    I'm not sure   Do you feel sinus pain or pressure in any of these areas?    Around my eyes    Behind my nose    In my cheeks   Not selected:    In my forehead    In my upper teeth or jaw    No   When did you first notice your sinus pain or pressure? Select one.    5 to 9 days ago   Not selected:    Less than 5 days ago    10 to 14 days ago    2 to 4 weeks ago    1 month ago or longer   Does coughing, sneezing, or leaning forward make your sinuses feel worse? Select one.    Yes   Not selected:    No   What color is your nasal drainage? Select one.    Green or greenish   Not selected:    Clear    White    Yellow or yellowish    My nose is stuffed but not draining or running   Is your nasal drainage thick or thin? Select one.    Thick   Not selected:    Thin   Is there any drainage (mucus) going down the back of your throat? This kind of drainage is also called \"postnasal drip.\" Select one.    Yes   Not selected:    No, not that I know of   Can you swallow liquids and solid foods? A sore throat may be painful when swallowing, but it shouldn't prevent you from swallowing. Select one.    Yes, with ease   " Not selected:    Yes, but it's uncomfortable    Yes, but it's painful    It's hard to swallow anything because it feels like liquids and food get stuck in my throat    No, I can't swallow anything, liquid or solid foods   Is your throat pain worse on one side than the other? Select one.    No, it's the same on both sides   Not selected:    Yes, it's worse on the right side    Yes, it's worse on the left side   Since your symptoms started, have you felt dizzy? Select one.    No   Not selected:    Yes, but I can continue with my regular daily activities    Yes, and it makes it hard to stand, walk, or do daily activities   Do you have chest pain? You might also feel it as discomfort, aching, tightness, or squeezing in the chest. Select one.    Yes   Not selected:    No   Which of these is true of your chest pain? Select one.    My chest hurts only when I cough   Not selected:    My chest hurts even when I'm not coughing   Have you urinated at least 3 times in the last 24 hours? Select one.    Yes   Not selected:    No   Changes in alertness or awareness may mean you need emergency care. Since your symptoms started, have you had any of these? Select all that apply.    None of the above   Not selected:    Confusion    Slurred speech    Not knowing where you are or what day it is    Difficulty staying conscious    Fainting or passing out   Do your symptoms include a whistling sound, or wheezing, when you breathe? Select one.    No   Not selected:    Yes    I'm not sure   Do you have any of these symptoms in your ear(s)? Select all that apply.    Plugged or blocked sensation   Not selected:    Pain    Pressure    Fullness    Crackling or popping    None of the above   Can you move your chin toward your chest?    Yes   Not selected:    No, my neck is too stiff   Are your tonsils larger than usual?    Yes   Not selected:    No, not that I can tell    I've had my tonsils removed   Is there any white or yellow pus on your  tonsils?    No, not that I can see   Not selected:    Yes   Are there red spots on the roof of your mouth or the back of your throat?    No, not that I can see   Not selected:    Yes   Are your glands/lymph nodes swollen, or does it hurt when you touch them?    No, not that I can tell   Not selected:    Yes   In the past 2 weeks, has anyone around you (such as at school, work, or home) had a confirmed diagnosis of strep throat? A confirmed diagnosis means that a throat swab and lab test were done to verify a strep throat infection. Select one.    No, not that I know of   Not selected:    Yes   Do you think you might have strep throat? Select one.    No   Not selected:    Yes   In the past week, has anyone around you (such as at school, work, or home) had a confirmed diagnosis of the flu? A confirmed diagnosis means that a nose swab was done to verify a flu infection. Select all that apply.    No, not that I know of   Not selected:    I live with someone who has the flu    I've been within touching distance of someone who has the flu    I've walked by, or sat about 3 feet away from, someone who has the flu    I've been in the same building as someone who has the flu   Have you ever been diagnosed with asthma? Select one.    No   Not selected:    Yes   Have you ever been prescribed albuterol to use for wheezing, cough, or shortness of breath caused by a cold, bronchitis, or pneumonia? Albuterol (ProAir, Proventil, Ventolin) is prescribed as an inhaler or a solution to be used with a nebulizer machine. Select one.    Yes   Not selected:    No, not that I know of   Have you ever been prescribed a steroid inhaler to use for wheezing, cough, or shortness of breath caused by a cold, bronchitis, or pneumonia? Some examples of steroid inhalers include Pulmicort, Flovent, Qvar, and Alvesco. Select one.    No, not that I know of   Not selected:    Yes   Have you used albuterol for your current symptoms? This includes both  albuterol inhalers and albuterol solution in a nebulizer machine. Select one.    Yes   Not selected:    No, I don't have any, or it's     No, I don't feel like I need it   How often are you using albuterol? If you're using albuterol very frequently, you'll need to be seen in person. Select one.    Every 4 to 6 hours   Not selected:    More than once an hour    Every 1 to 2 hours    Every 2 to 3 hours    Every 3 to 4 hours    Every 6 hours or longer   Do you need a refill of albuterol? Select one.    Yes   Not selected:    No   What form of albuterol do you need? Select one.    Inhaler   Not selected:    Nebulizer solution   Have you ever been diagnosed with chronic obstructive pulmonary disease (COPD)? Select one.    No, not that I know of   Not selected:    Yes   In the past month, have you taken antibiotics for similar symptoms? Examples of antibiotics include amoxicillin, amoxicillin-clavulanate (Augmentin), penicillin, cefdinir (Omnicef), doxycycline, and clindamycin (Cleocin). Select one.    No   Not selected:    Yes (specify)   In the last year, how many times were you treated with antibiotics for a sinus infection? Select one.    1 to 3 times   Not selected:    None    4 or more times   Have you been diagnosed with a deviated septum or nasal polyps? The nose is divided into two nostrils by the septum. A crooked septum is called a deviated septum. Nasal polyps are growths inside the nose or sinuses. Select one.    No, not that I know of   Not selected:    Yes, but I had surgery to treat them    Yes, I have a deviated septum    Yes, I have nasal polyps    Yes, I have a deviated septum and nasal polyps   Do you have a sore inside your nose that won't heal? Select one.    No, not that I know of   Not selected:    Yes   Do you have allergies (pollen, dust mites, mold, animal dander)? Select one.    Yes   Not selected:    No, not that I know of   What kind of allergies do you have? Select all that apply.     Seasonal allergies (hay fever)    Pet allergies   Not selected:    Dust allergies    None of the above    I'm not sure   Do you think your symptoms could be allergy-related? Select one.    No   Not selected:    Yes    I'm not sure   Have you had a flu shot this season? Select one.    No   Not selected:    Yes, less than 2 weeks ago    Yes, 2 to 4 weeks ago    Yes, 1 to 3 months ago    Yes, 3 to 6 months ago    Yes, more than 6 months ago   Are you pregnant? Select one.    No   Not selected:    Yes   When was your last menstrual period? If you don't currently have periods or no longer have periods, please briefly explain.       Within the last 2 weeks, have you: - Given birth - Had a miscarriage - Had a pregnancy loss - Had an  Being postpartum (live birth or loss) within the last 2 weeks increases your risk of flu complications. Select one.    No   Not selected:    Yes   Are you breastfeeding? Select one.    No   Not selected:    Yes   The flu and COVID-19 can be more serious for people in certain groups. The next few questions help us figure out if you or anyone you live with is at higher risk for complications from these infections. Do any of these statements apply to you? Select all that apply.    None of the above   Not selected:    I'm     I'm     I'm Black    I'm  or    Do you smoke tobacco? Select one.    No   Not selected:    Yes, every day    Yes, some days    No, I quit   Do you have any of these conditions? Select all that apply.    None of the above   Not selected:    Chronic lung disease, such as cystic fibrosis or interstitial fibrosis    Heart disease, such as congenital heart disease, congestive heart failure, or coronary artery disease    Disorder of the brain, spinal cord, or nerves and muscles, such as dementia, cerebral palsy, epilepsy, muscular dystrophy, or developmental delay    Metabolic disorder or mitochondrial disease     Cerebrovascular disease, such as stroke or another condition affecting the blood vessels or blood supply to the brain    Down syndrome    Mood disorder, including depression or schizophrenia spectrum disorders    Substance use disorder, such as alcohol, opioid, or cocaine use disorder    Tuberculosis   Do you live in a group care setting? Examples include: - Nursing home - Residential care - Psychiatric treatment facility - Group home - Dormitory - Board and care home - Homeless shelter - Foster care setting Select one.    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 To determine your BMI, we need to know your weight and height. Please enter your weight (in pounds).    Weight   Please enter your height.    Height   Do you have any of these conditions that can affect the immune system? Scroll to see all options. Select all that apply.    None of these   Not selected:    History of bone marrow transplant    Chronic kidney disease    Chronic liver disease (including cirrhosis)    HIV/AIDS    Inflammatory bowel disease (Crohn's disease or ulcerative colitis)    Lupus    Moderate to severe plaque psoriasis    Multiple sclerosis    Rheumatoid arthritis    Sickle cell anemia    Alpha or beta thalassemia    History of solid organ transplant (kidney, liver, or heart)    History of spleen removal    An autoimmune disorder not listed here (specify)    A condition requiring treatment with long-term use of oral steroids (such as prednisone, prednisolone, or dexamethasone) (specify)   Have you ever been diagnosed with cancer? Select one.    No   Not selected:    Yes, I have cancer now    Yes, but I'm in remission   Do any of these apply to you? Select all that apply.    None of the above   Not selected:    I've been hospitalized within  the last 5 days    I have diabetes    I'm in close contact with a child in    The flu and COVID-19 can be more serious for people in certain groups. Do any of these apply to the people who live with you? Select all that apply.    None of the above   Not selected:    Under age 5    Over age 65            Black     or     Pregnant    Has given birth, had a miscarriage, had a pregnancy loss, or had an  in the last 2 weeks   Does any member of your household have any of these medical conditions? Select all that apply.    None of the above   Not selected:    Asthma    Disorders of the brain, spinal cord, or nerves and muscles, such as dementia, cerebral palsy, epilepsy, muscular dystrophy, or developmental delay    Chronic lung disease, such as COPD or cystic fibrosis    Heart disease, such as congenital heart disease, congestive heart failure, or coronary artery disease    Cerebrovascular disease, such as stroke or another condition affecting the blood vessels or blood supply to the brain    Blood disorders, such as sickle cell disease    Diabetes    Metabolic disorders such as inherited metabolic disorders or mitochondrial disease    Kidney disorders    Liver disorders    Weakened immune system due to illness or medications such as chemotherapy or steroids    Children under the age of 19 who are on long-term aspirin therapy    Extreme obesity (BMI > 40)   Do you have any of these conditions? Scroll to see all options. Select all that apply.    None of the above   Not selected:    Aspirin triad (also known as Samter's triad or ASA triad)    Asthma or hives from taking aspirin or other NSAIDs, such as ibuprofen or naproxen    Blockage or narrowing of the blood vessels of the heart    Blood clotting disorder    Blood dyscrasia, such anemia, leukemia, lymphoma, or myeloma    Bone marrow depression    Catecholamine-releasing paraganglioma    Congenital long QT  syndrome    Depression    Difficulty urinating or completely emptying your bladder    Uncorrected electrolyte abnormalities    Fungal infection    Gastrointestinal (GI) bleeding    Gastrointestinal (GI) obstruction    G6PD deficiency    Recent heart attack    High blood pressure    Irregular heartbeat or heart rhythm    Mononucleosis (mono)    Myasthenia gravis    Parkinson's disease    Pheochromocytoma    Reye syndrome    Seizure disorder    Thyroid disease    Ulcerative colitis   Have you ever had either of these conditions? Select all that apply.    No   Not selected:    Metoclopramide-associated dystonic reaction    Tardive dyskinesia   Just a few more questions about medications, and then you're finished. Have you used any non-prescription medications or nasal sprays for your current symptoms? Examples include saline sprays, decongestants, NyQuil, and Tylenol. Select one.    Yes   Not selected:    No   Which of these non-prescription medications have you tried? Scroll to see all options. Select all that apply.    Guaifenesin (Mucinex)    Ibuprofen (Advil, Motrin, Midol)   Not selected:    Acetaminophen (Tylenol)    Budesonide (Rhinocort)    Cetirizine (Zyrtec)    Chlorpheniramine (Aller-chlor, Chlor-Trimeton)    Cromolyn (NasalCrom)    Dextromethorphan (Delsym, Robitussin, Vicks DayQuil Cough)    Diphenhydramine (Benadryl)    Fexofenadine (Allegra)    Fluticasone (Flonase)    Guaifenesin/dextromethorphan (Delsym DM, Mucinex DM, Robitussin DM)    Ketotifen (Alaway, Zaditor)    Loratadine (Alavert, Claritin)    Naphazoline-pheniramine (Naphcon-A, Opcon-A, Visine-A)    Omeprazole (Prilosec)    Oxymetazoline (Afrin)    Phenylephrine (Sudafed PE)    Triamcinolone (Nasacort)    None of the above   Have you taken any monoamine oxidase inhibitor (MAOI) medications in the last 14 days? Examples include rasagiline (Azilect), selegiline (Eldepryl, Zelapar), isocarboxazid (Marplan), phenelzine (Nardil), and tranylcypromine  (Parnate). Select one.    No, not that I know of   Not selected:    Yes   Do you take Kynmobi or Apokyn (apomorphine)? Select one.    No   Not selected:    Yes   Are you taking any other medications, vitamins, or supplements? Select one.    No   Not selected:    Yes   Have you ever had an allergic or bad reaction to any medication? Select one.    No   Not selected:    Yes   Are you allergic to milk or to the proteins found in milk (for example, whey or casein)? A milk allergy is different from lactose intolerance. Select one.    No, not that I know of   Not selected:    Yes   Have you ever had jaundice or liver problems as a result of taking amoxicillin-clavulanate (Augmentin)? Jaundice is a condition in which the skin and the whites of the eyes turn yellow. Select all that apply.    No, not that I know of   Not selected:    Yes, jaundice    Yes, liver problems   Have you ever had jaundice or liver problems as a result of taking azithromycin (Zithromax, Zmax)? Jaundice is a condition in which the skin and the whites of the eyes turn yellow. Select all that apply.    No, not that I know of   Not selected:    Yes, jaundice    Yes, liver problems   Do you need a doctor's note? A doctor's note confirms that you received care today and states when you can return to school or work. It does not contain information about your diagnosis or treatment plan. Your provider will make the final decision on whether to give you a doctor's note and for how long. Doctor's notes CANNOT be backdated. We can't provide medical leave paperwork through this type of visit. If more paperwork is needed to request time off, contact your primary care provider. Select one.    No   Not selected:    Today only (1 day)    Today and tomorrow (2 days)    3 days    5 days    7 days    10 days    14 days   Is there anything else you'd like to tell us about your symptoms?   The patient did not enter any additional information.   ----------   Medical  history   Medical history data does not currently exist for this patient.

## 2024-01-03 ENCOUNTER — OFFICE VISIT (OUTPATIENT)
Dept: INTERNAL MEDICINE | Facility: CLINIC | Age: 34
End: 2024-01-03
Payer: COMMERCIAL

## 2024-01-03 VITALS
DIASTOLIC BLOOD PRESSURE: 80 MMHG | SYSTOLIC BLOOD PRESSURE: 116 MMHG | WEIGHT: 134 LBS | OXYGEN SATURATION: 98 % | TEMPERATURE: 98.9 F | BODY MASS INDEX: 21.53 KG/M2 | HEART RATE: 72 BPM | HEIGHT: 66 IN

## 2024-01-03 DIAGNOSIS — N64.4 BREAST PAIN: ICD-10-CM

## 2024-01-03 DIAGNOSIS — N94.6 DYSMENORRHEA: ICD-10-CM

## 2024-01-03 DIAGNOSIS — Z87.898 HISTORY OF FIBROCYSTIC DISEASE OF BREAST: ICD-10-CM

## 2024-01-03 DIAGNOSIS — F41.9 ANXIETY: Primary | ICD-10-CM

## 2024-01-03 DIAGNOSIS — Z80.0 FAMILY HISTORY OF COLON CANCER: ICD-10-CM

## 2024-01-03 DIAGNOSIS — Z13.220 SCREENING FOR HYPERLIPIDEMIA: ICD-10-CM

## 2024-01-03 DIAGNOSIS — M79.10 GENERALIZED MUSCLE ACHE: ICD-10-CM

## 2024-01-03 PROCEDURE — 99204 OFFICE O/P NEW MOD 45 MIN: CPT | Performed by: INTERNAL MEDICINE

## 2024-01-03 NOTE — PROGRESS NOTES
"Chief Complaint  Establish Care, anxiety    Subjective        Maria D Conway presents to [facility]  History of Present Illness    Ms. Conway is a rahel 34 yo F who presents to [facility] and discuss history of dysmenorrhea, HA triggered by OCP/tension HA possibly neck pain related, anxiety.    Sees GYN and Dermatology.     History of needing breast ultrasounds for 3 lumps on right side.  1 was biopsied while pregnant and was benign.       Objective   Vital Signs:  /80 (BP Location: Left arm, Patient Position: Sitting, Cuff Size: Adult)   Pulse 72   Temp 98.9 °F (37.2 °C) (Infrared)   Ht 167.6 cm (65.98\")   Wt 60.8 kg (134 lb)   SpO2 98%   BMI 21.64 kg/m²   Estimated body mass index is 21.64 kg/m² as calculated from the following:    Height as of this encounter: 167.6 cm (65.98\").    Weight as of this encounter: 60.8 kg (134 lb).       BMI is within normal parameters. No other follow-up for BMI required.      Physical Exam  Vitals reviewed.   Constitutional:       General: She is not in acute distress.     Appearance: Normal appearance.   HENT:      Head: Normocephalic and atraumatic.      Nose: Nose normal.      Mouth/Throat:      Mouth: Mucous membranes are moist.   Eyes:      Conjunctiva/sclera: Conjunctivae normal.   Cardiovascular:      Rate and Rhythm: Normal rate and regular rhythm.      Pulses: Normal pulses.      Heart sounds: Normal heart sounds.   Pulmonary:      Effort: Pulmonary effort is normal.      Breath sounds: Normal breath sounds.   Abdominal:      Palpations: Abdomen is soft.      Tenderness: There is no abdominal tenderness.   Musculoskeletal:      Right lower leg: No edema.      Left lower leg: No edema.   Skin:     General: Skin is warm and dry.   Neurological:      General: No focal deficit present.      Mental Status: She is alert.   Psychiatric:         Mood and Affect: Mood normal.        Result Review :  The following data was reviewed " by: Libby Leal MD on 01/03/2024:  Common labs          1/3/2024    12:20   Common Labs   Glucose 80    BUN 20    Creatinine 0.88    Sodium 140    Potassium 4.2    Chloride 103    Calcium 10.0    Total Protein 7.8    Albumin 5.1    Total Bilirubin 0.4    Alkaline Phosphatase 50    AST (SGOT) 20    ALT (SGPT) 23    WBC 9.15    Hemoglobin 14.5    Hematocrit 43.5    Platelets 231    Total Cholesterol 187    Triglycerides 84    HDL Cholesterol 89    LDL Cholesterol  83      Data reviewed : labs from 2020 available in system             Assessment and Plan   Diagnoses and all orders for this visit:    1. Anxiety (Primary)  -     CBC & Differential  -     T4, Free  -     TSH    2. Breast pain  -     Mammo Diagnostic Digital Tomosynthesis Bilateral With CAD  -     US breast bilateral complete    3. History of fibrocystic disease of breast  -     Mammo Diagnostic Digital Tomosynthesis Bilateral With CAD  -     US breast bilateral complete    4. Dysmenorrhea  -     CBC & Differential    5. Generalized muscle ache  -     Comprehensive Metabolic Panel  -     Vitamin D,25-Hydroxy    6. Screening for hyperlipidemia  -     Lipid Panel With LDL / HDL Ratio    7. Family history of colon cancer  -     Ambulatory Referral to Gastroenterology             Follow Up   Return in about 1 year (around 1/3/2025) for Annual physical.  Patient was given instructions and counseling regarding her condition or for health maintenance advice. Please see specific information pulled into the AVS if appropriate.

## 2024-01-04 LAB
25(OH)D3+25(OH)D2 SERPL-MCNC: 46 NG/ML (ref 30–100)
ALBUMIN SERPL-MCNC: 5.1 G/DL (ref 3.5–5.2)
ALBUMIN/GLOB SERPL: 1.9 G/DL
ALP SERPL-CCNC: 50 U/L (ref 39–117)
ALT SERPL-CCNC: 23 U/L (ref 1–33)
AST SERPL-CCNC: 20 U/L (ref 1–32)
BASOPHILS # BLD AUTO: 0.05 10*3/MM3 (ref 0–0.2)
BASOPHILS NFR BLD AUTO: 0.5 % (ref 0–1.5)
BILIRUB SERPL-MCNC: 0.4 MG/DL (ref 0–1.2)
BUN SERPL-MCNC: 20 MG/DL (ref 6–20)
BUN/CREAT SERPL: 22.7 (ref 7–25)
CALCIUM SERPL-MCNC: 10 MG/DL (ref 8.6–10.5)
CHLORIDE SERPL-SCNC: 103 MMOL/L (ref 98–107)
CHOLEST SERPL-MCNC: 187 MG/DL (ref 0–200)
CO2 SERPL-SCNC: 25.6 MMOL/L (ref 22–29)
CREAT SERPL-MCNC: 0.88 MG/DL (ref 0.57–1)
EGFRCR SERPLBLD CKD-EPI 2021: 88 ML/MIN/1.73
EOSINOPHIL # BLD AUTO: 0.11 10*3/MM3 (ref 0–0.4)
EOSINOPHIL NFR BLD AUTO: 1.2 % (ref 0.3–6.2)
ERYTHROCYTE [DISTWIDTH] IN BLOOD BY AUTOMATED COUNT: 12.2 % (ref 12.3–15.4)
GLOBULIN SER CALC-MCNC: 2.7 GM/DL
GLUCOSE SERPL-MCNC: 80 MG/DL (ref 65–99)
HCT VFR BLD AUTO: 43.5 % (ref 34–46.6)
HDLC SERPL-MCNC: 89 MG/DL (ref 40–60)
HGB BLD-MCNC: 14.5 G/DL (ref 12–15.9)
IMM GRANULOCYTES # BLD AUTO: 0.03 10*3/MM3 (ref 0–0.05)
IMM GRANULOCYTES NFR BLD AUTO: 0.3 % (ref 0–0.5)
LDLC SERPL CALC-MCNC: 83 MG/DL (ref 0–100)
LDLC/HDLC SERPL: 0.91 {RATIO}
LYMPHOCYTES # BLD AUTO: 2.38 10*3/MM3 (ref 0.7–3.1)
LYMPHOCYTES NFR BLD AUTO: 26 % (ref 19.6–45.3)
MCH RBC QN AUTO: 30 PG (ref 26.6–33)
MCHC RBC AUTO-ENTMCNC: 33.3 G/DL (ref 31.5–35.7)
MCV RBC AUTO: 90.1 FL (ref 79–97)
MONOCYTES # BLD AUTO: 0.65 10*3/MM3 (ref 0.1–0.9)
MONOCYTES NFR BLD AUTO: 7.1 % (ref 5–12)
NEUTROPHILS # BLD AUTO: 5.93 10*3/MM3 (ref 1.7–7)
NEUTROPHILS NFR BLD AUTO: 64.9 % (ref 42.7–76)
NRBC BLD AUTO-RTO: 0 /100 WBC (ref 0–0.2)
PLATELET # BLD AUTO: 231 10*3/MM3 (ref 140–450)
POTASSIUM SERPL-SCNC: 4.2 MMOL/L (ref 3.5–5.2)
PROT SERPL-MCNC: 7.8 G/DL (ref 6–8.5)
RBC # BLD AUTO: 4.83 10*6/MM3 (ref 3.77–5.28)
SODIUM SERPL-SCNC: 140 MMOL/L (ref 136–145)
T4 FREE SERPL-MCNC: 1.18 NG/DL (ref 0.93–1.7)
TRIGL SERPL-MCNC: 84 MG/DL (ref 0–150)
TSH SERPL DL<=0.005 MIU/L-ACNC: 1.05 UIU/ML (ref 0.27–4.2)
VLDLC SERPL CALC-MCNC: 15 MG/DL (ref 5–40)
WBC # BLD AUTO: 9.15 10*3/MM3 (ref 3.4–10.8)

## 2024-01-15 ENCOUNTER — APPOINTMENT (OUTPATIENT)
Dept: WOMENS IMAGING | Facility: HOSPITAL | Age: 34
End: 2024-01-15
Payer: COMMERCIAL

## 2024-01-15 PROCEDURE — 76641 ULTRASOUND BREAST COMPLETE: CPT | Performed by: RADIOLOGY

## 2024-01-15 PROCEDURE — 77062 BREAST TOMOSYNTHESIS BI: CPT | Performed by: RADIOLOGY

## 2024-01-15 PROCEDURE — 77066 DX MAMMO INCL CAD BI: CPT | Performed by: RADIOLOGY

## 2024-01-15 PROCEDURE — G0279 TOMOSYNTHESIS, MAMMO: HCPCS | Performed by: RADIOLOGY

## 2024-01-16 ENCOUNTER — TELEPHONE (OUTPATIENT)
Dept: INTERNAL MEDICINE | Facility: CLINIC | Age: 34
End: 2024-01-16
Payer: COMMERCIAL

## 2024-01-16 DIAGNOSIS — R92.8 MAMMOGRAM ABNORMAL: Primary | ICD-10-CM

## 2024-01-16 NOTE — TELEPHONE ENCOUNTER
[facility] CALLED REGARDING THIS PATIENT AND THEY ARE RECOMMENDING 2 RIGHT BREAST ULTRASOUND GUIDED BIOPSIES AND 1 LEFT BREAST ULTRASOUND GUIDED BIOPSY. THEY WILL BE SENDING OVER A REPORT.

## 2024-02-05 ENCOUNTER — APPOINTMENT (OUTPATIENT)
Dept: WOMENS IMAGING | Facility: HOSPITAL | Age: 34
End: 2024-02-05
Payer: COMMERCIAL

## 2024-02-05 ENCOUNTER — LAB REQUISITION (OUTPATIENT)
Dept: LAB | Facility: HOSPITAL | Age: 34
End: 2024-02-05
Payer: COMMERCIAL

## 2024-02-05 DIAGNOSIS — N63.0 UNSPECIFIED LUMP IN UNSPECIFIED BREAST: ICD-10-CM

## 2024-02-05 PROCEDURE — A4648 IMPLANTABLE TISSUE MARKER: HCPCS | Performed by: RADIOLOGY

## 2024-02-05 PROCEDURE — 19083 BX BREAST 1ST LESION US IMAG: CPT | Performed by: RADIOLOGY

## 2024-02-05 PROCEDURE — 19084 BX BREAST ADD LESION US IMAG: CPT | Performed by: RADIOLOGY

## 2024-02-05 PROCEDURE — 88305 TISSUE EXAM BY PATHOLOGIST: CPT | Performed by: INTERNAL MEDICINE

## 2024-02-06 LAB
DX PRELIMINARY: NORMAL
LAB AP CASE REPORT: NORMAL
PATH REPORT.FINAL DX SPEC: NORMAL
PATH REPORT.GROSS SPEC: NORMAL

## 2024-03-25 ENCOUNTER — OFFICE VISIT (OUTPATIENT)
Dept: GASTROENTEROLOGY | Facility: CLINIC | Age: 34
End: 2024-03-25
Payer: COMMERCIAL

## 2024-03-25 VITALS
WEIGHT: 142.6 LBS | BODY MASS INDEX: 22.92 KG/M2 | DIASTOLIC BLOOD PRESSURE: 70 MMHG | SYSTOLIC BLOOD PRESSURE: 114 MMHG | HEIGHT: 66 IN

## 2024-03-25 DIAGNOSIS — Z80.0 FAMILY HISTORY OF COLON CANCER: Primary | ICD-10-CM

## 2024-03-25 PROCEDURE — 99203 OFFICE O/P NEW LOW 30 MIN: CPT

## 2024-03-25 RX ORDER — UREA 10 %
27 LOTION (ML) TOPICAL 2 TIMES DAILY
COMMUNITY

## 2024-03-25 NOTE — PROGRESS NOTES
PATIENT INFORMATION  Maria D Conway       - 1990    CHIEF COMPLAINT  Chief Complaint   Patient presents with    FHx Colon Cancer       HISTORY OF PRESENT ILLNESS  Here today for Evaluation for colonoscopy    On mother's side grandmother had CRC 60s  Great grandfather and great grandmother passed from colon cancer (80s)  Mother will not do screening      No issues with her bowels, other than a little constipation on vacation and with pregnancy, hemorrhoid flares every now and then. Only small blood when hemorrhoid flares once every few months.    Reviewed screening recommendations and should have first colonoscopy at 38.          REVIEWED PERTINENT RESULTS/ LABS  Lab Results   Component Value Date    CASEREPORT  2024     Surgical Pathology Report                         Case: [id number]                                  Authorizing Provider:  Libby Leal MD     Collected:           2024 02:08 PM          Ordering Location:     [facility]  Received:            2024 04:21 PM                                 LABORATORY                                                                   Pathologist:           Kori Jimenez MD                                                          Specimens:   1) - Breast, Right, right breast 900, formalin at 1410                                              2) - Breast, Right, right breast 1000, formalin at 1402                                             3) - Breast, Left, left breast 200, formalin at 1339                                       FINALDX  2024     1. Right Breast, 9:00, Ultrasound-Guided Core Needle Biopsy:   A. Fragments of fibroadenoma.    2. Right Breast, 10:00, Ultrasound-Guided Core Needle Biopsy:   A. Fragments of fibroadenoma with microcysts and dilated ducts.    3. Left Breast, 2:00, Ultrasound-Guided Core Needle Biopsy:   A. Fragments of fibroadenoma.       Lab Results   Component Value Date    HGB 14.5  2024    MCV 90.1 2024     2024    ALT 23 2024    AST 20 2024    TRIG 84 2024      No results found.    REVIEW OF SYSTEMS  Review of Systems   Constitutional: Negative.    HENT: Negative.     Eyes: Negative.    Respiratory: Negative.     Cardiovascular: Negative.    Gastrointestinal:  Negative for abdominal pain, constipation, diarrhea, nausea and vomiting.        FHX CRC/P   Endocrine: Negative.    Genitourinary: Negative.    Musculoskeletal:  Positive for neck pain (Injury related).   Skin: Negative.    Allergic/Immunologic: Negative.    Neurological: Negative.    Hematological: Negative.    Psychiatric/Behavioral: Negative.           ACTIVE PROBLEMS  Patient Active Problem List    Diagnosis     Pregnancy [Z34.90]          PAST MEDICAL HISTORY  Past Medical History:   Diagnosis Date    Migraine          SURGICAL HISTORY  Past Surgical History:   Procedure Laterality Date    BREAST BIOPSY      2019    UPPER GASTROINTESTINAL ENDOSCOPY      No issues    WISDOM TOOTH EXTRACTION           FAMILY HISTORY  Family History   Problem Relation Age of Onset    Cancer Maternal Grandmother         Colon Cancer    Cancer Other         Breast Cancer, Colon Cancer    Cancer Other         Colon Cancer         SOCIAL HISTORY  Social History     Occupational History    Not on file   Tobacco Use    Smoking status: Former     Current packs/day: 0.00     Types: Cigarettes     Quit date: 2013     Years since quittin.2    Smokeless tobacco: Never   Substance and Sexual Activity    Alcohol use: Not Currently    Drug use: Never    Sexual activity: Not on file         CURRENT MEDICATIONS    Current Outpatient Medications:     KRILL OIL PO, Take  by mouth Daily., Disp: , Rfl:     magnesium, as, gluconate (MAGONATE) 500 (27 Mg) MG tablet, Take 1 tablet by mouth 2 (Two) Times a Day., Disp: , Rfl:     Prenatal Vit-Fe Fumarate-FA (PRENATAL, CLASSIC, VITAMIN) 28-0.8 MG tablet tablet, Take  by  "mouth Daily., Disp: , Rfl:     Probiotic Product (PROBIOTIC DAILY PO), Take  by mouth Daily., Disp: , Rfl:     ALLERGIES  Epinephrine    VITALS  Vitals:    03/25/24 1340   BP: 114/70   BP Location: Left arm   Patient Position: Sitting   Cuff Size: Large Adult   Weight: 64.7 kg (142 lb 9.6 oz)   Height: 167.6 cm (65.98\")       PHYSICAL EXAM  Debilities/Disabilities Identified: None  Emotional Behavior: Appropriate  Wt Readings from Last 3 Encounters:   03/25/24 64.7 kg (142 lb 9.6 oz)   01/03/24 60.8 kg (134 lb)   01/13/23 61.2 kg (135 lb)     Ht Readings from Last 1 Encounters:   03/25/24 167.6 cm (65.98\")     Body mass index is 23.03 kg/m².  Physical Exam  Constitutional:       General: She is not in acute distress.     Appearance: Normal appearance. She is not ill-appearing.   HENT:      Head: Normocephalic and atraumatic.      Mouth/Throat:      Mouth: Mucous membranes are moist.      Pharynx: No posterior oropharyngeal erythema.   Eyes:      General: No scleral icterus.  Cardiovascular:      Rate and Rhythm: Normal rate and regular rhythm.      Heart sounds: Normal heart sounds.   Pulmonary:      Effort: Pulmonary effort is normal.      Breath sounds: Normal breath sounds.   Abdominal:      General: Abdomen is flat. Bowel sounds are normal. There is no distension.      Palpations: Abdomen is soft. There is no mass.      Tenderness: There is no abdominal tenderness. There is no guarding or rebound. Negative signs include Lowe's sign.      Hernia: No hernia is present.   Musculoskeletal:      Cervical back: Neck supple.   Skin:     General: Skin is warm.      Capillary Refill: Capillary refill takes less than 2 seconds.   Neurological:      General: No focal deficit present.      Mental Status: She is alert and oriented to person, place, and time.   Psychiatric:         Mood and Affect: Mood normal.         Behavior: Behavior normal.         Thought Content: Thought content normal.         Judgment: Judgment " normal.         CLINICAL DATA REVIEWED   reviewed previous lab results and integrated with today's visit, reviewed notes from other physicians and/or last GI encounter, reviewed previous endoscopy results and available photos, reviewed surgical pathology results from previous biopsies    ASSESSMENT  Diagnoses and all orders for this visit:    Family history of colon cancer    Other orders  -     magnesium, as, gluconate (MAGONATE) 500 (27 Mg) MG tablet; Take 1 tablet by mouth 2 (Two) Times a Day.  -     KRILL OIL PO; Take  by mouth Daily.  -     Probiotic Product (PROBIOTIC DAILY PO); Take  by mouth Daily.          PLAN    Recommend daily fiber supplement    Return if symptoms worsen or fail to improve.    I have discussed the above plan with the patient.  They verbalize understanding and are in agreement with the plan.  They have been advised to contact the office for any questions, concerns, or changes related to their health.

## 2024-04-05 ENCOUNTER — APPOINTMENT (OUTPATIENT)
Dept: WOMENS IMAGING | Facility: HOSPITAL | Age: 34
End: 2024-04-05
Payer: COMMERCIAL

## 2024-04-05 PROCEDURE — 77061 BREAST TOMOSYNTHESIS UNI: CPT | Performed by: RADIOLOGY

## 2024-04-05 PROCEDURE — 77065 DX MAMMO INCL CAD UNI: CPT | Performed by: RADIOLOGY

## 2024-04-05 PROCEDURE — G0279 TOMOSYNTHESIS, MAMMO: HCPCS | Performed by: RADIOLOGY

## 2024-04-05 PROCEDURE — 76642 ULTRASOUND BREAST LIMITED: CPT | Performed by: RADIOLOGY

## 2024-07-29 ENCOUNTER — CLINICAL SUPPORT (OUTPATIENT)
Dept: GENETICS | Facility: HOSPITAL | Age: 34
End: 2024-07-29
Payer: COMMERCIAL

## 2024-07-29 ENCOUNTER — OFFICE VISIT (OUTPATIENT)
Dept: MAMMOGRAPHY | Facility: CLINIC | Age: 34
End: 2024-07-29
Payer: COMMERCIAL

## 2024-07-29 VITALS
DIASTOLIC BLOOD PRESSURE: 80 MMHG | SYSTOLIC BLOOD PRESSURE: 130 MMHG | BODY MASS INDEX: 22.99 KG/M2 | HEIGHT: 65 IN | OXYGEN SATURATION: 99 % | WEIGHT: 138 LBS | HEART RATE: 64 BPM

## 2024-07-29 DIAGNOSIS — Z13.79 GENETIC TESTING: Primary | ICD-10-CM

## 2024-07-29 DIAGNOSIS — Z80.42 FAMILY HISTORY OF PROSTATE CANCER: ICD-10-CM

## 2024-07-29 DIAGNOSIS — Z91.89 AT INCREASED RISK OF BREAST CANCER: Primary | ICD-10-CM

## 2024-07-29 DIAGNOSIS — Z80.3 FAMILY HISTORY OF BREAST CANCER: ICD-10-CM

## 2024-07-29 DIAGNOSIS — Z80.0 FAMILY HISTORY OF COLON CANCER: ICD-10-CM

## 2024-07-29 PROCEDURE — 96040: CPT

## 2024-07-29 PROCEDURE — 99204 OFFICE O/P NEW MOD 45 MIN: CPT | Performed by: SURGERY

## 2024-07-29 NOTE — PROGRESS NOTES
Maria D Conway, a 33 y.o. female, was referred for genetic counseling due to a family history of cancer. Ms. Conway has no personal history of cancer. she was 9 at menarche and had her first child at age 29. she is pre-menopausal and retains her uterus and ovaries. Ms. Conway is following with Dr. Blanc in 13 months and reports she is starting annual mammograms. she has had 4 biopsies, 3 in 2014 and one in 2019, which were benign. she has not yet started colonoscopies. she was interested in discussing her risk for a hereditary cancer syndrome. Ms. Conway decided to pursue comprehensive genetic testing to evaluate her risk of cancer, therefore the Multi-Cancer Panel was ordered through [facility] which analyzes 70 genes associated with an increased cancer risk. A buccal kit was collected in office on 7/29/2024. Results are expected 2-3 weeks after the lab receives her sample.     PERTINENT FAMILY HISTORY: (See attached pedigree)   Pat. Grandfather:    Unknown cancer  Mat. Grandfather:     Prostate cancer, late 60s  Mat. Great-Grandfather 1:    Colon cancer, 81  Mat. Great-Grandmother 1:    Breast cancer, late 60s        Colon cancer, 90  Mat. Grandmother:     Colon cancer, 72  Mat. Great-Grandfather 2:    Bone cancer, unsure if primary or metastatic  Mat. Great-Grandmother 2:    Colon cancer    We do not have medical records regarding any of these diagnoses.     RISK ASSESSMENT:  Ms. Conway's family history of  cancer raises the question of a hereditary cancer syndrome. She does not meet NCCN guidelines for genetic testing. Despite this, we discussed how genetic testing is still available to her. This risk assessment is based on the family history information provided at the time of the appointment.  The assessment could change in the future should new information be obtained.    GENETIC COUNSELING (30 minutes):  We reviewed the family history information in detail. Cases of cancer follow three general patterns:  sporadic, familial, and hereditary.  While most cancer is sporadic, some cases appear to occur in family clusters.  These cases are said to be familial and account for 10-20% of cancer cases.  Familial cases may be due to a combination of shared genes and environmental factors among family members.  In even fewer cases, the risk for cancer is inherited, and the genes responsible for the increased cancer risk are known.       Family histories typical of hereditary cancer syndromes usually include multiple first- and second-degree relatives diagnosed with cancer types that define a syndrome.  These cases tend to be diagnosed at younger-than-expected ages and can be bilateral or multifocal.  The cancer in these families follows an autosomal dominant inheritance pattern, which indicates the likely presence of a mutation in a cancer susceptibility gene.  Children and siblings of an individual believed to carry this mutation have a 50% chance of inheriting that mutation, thereby inheriting the increased risk to develop cancer.  These mutations can be passed down from the maternal or the paternal lineage.     Due to Ms. Conway's family history of breast cancer, we discussed hereditary breast cancer. Hereditary breast cancer accounts for 5-10% of all cases of breast cancer.  A significant proportion of hereditary breast cancer can be attributed to mutations in the BRCA1 and BRCA2 genes.  Mutations in these genes confer an increased risk for breast cancer, ovarian cancer, male breast cancer, prostate cancer, and pancreatic cancer.  Women with a BRCA1 or BRCA2 mutation have over a 60% lifetime risk of breast cancer and up to a 58% risk of ovarian cancer.  There are other clinically significant breast cancer related genes in addition to BRCA1/2.      There are other genes that are known to be associated with an increased risk for cancer.  Some of these genes have well defined cancer risks and established management guidelines.  Other genes that can be tested for have been more recently described, and there may be less data regarding the risks and therefore may not have established management guidelines. We discussed these limitations at length. Based on Ms. Conway's desire to get as much information as possible regarding her personal risks and potential risks for her family, she opted to pursue testing through a panel that would look at several other genes known to increase the risk for cancer.     GENETIC TESTING:  The risks, benefits, and limitations of genetic testing and implications for clinical management following testing were reviewed. DNA test results can influence decisions regarding screening and prevention. Genetic testing can have significant psychological implications for both individuals and families. Also discussed was the possibility of employment and insurance discrimination based on genetic test results and the laws in place to prevent this, as well as the limitations of these laws.       We discussed panel testing, which would involve testing 70 genes associated with increased cancer risk. The implications of a positive or negative test result were discussed.  We also discussed the importance of testing an affected relative and how a negative result for Ms. Conway wouldn't necessarily mean the cancers presenting in her family weren't due to a genetic mutation that Ms. Conway did not inherit.  In general, a negative genetic test result is most informative if a mutation has first been established in an affected member of the family.  In cases where an affected individual is not available or interested in testing, it is appropriate to offer testing to an unaffected individual.     We discussed the possibility that, in some cases, genetic test results may be ambiguous due to the identification of a genetic variant of uncertain significance (VUS). These variants may or may not be associated with an increased cancer risk.  With multigene panel testing, it is not uncommon for a VUS to be identified.  If a VUS is identified, testing family members is typically not recommended and screening recommendations are made based on the family history.  The laboratories that perform genetic testing work to reclassify the VUS and send out an amended report if and when a VUS is reclassified.  The majority of variant findings are ultimately reclassified to a negative result. Given Ms. Conway's family history, a negative test result does not eliminate all cancer risk, although the risk may not be as high as it would with positive genetic testing.     PLAN: Genetic testing was ordered via the Multi-Cancer Panel through [facility]. A buccal kit was collected in office on 7/29/2024. Results are expected 2-3 weeks after the lab receives her sample. If she has any questions in the meantime, she is welcome to call me at [phone].      Cecilia Wang, MS, [facility] [place], [facility]  Licensed Certified Genetic Counselor

## 2024-07-29 NOTE — LETTER
July 29, 2024     BERRY Winkler  [address]  [facility] [address]  [place] [address]    Patient: Maria D Conway   YOB: 1990   Date of Visit: 7/29/2024     Dear BERRY Winkler:       Thank you for referring Maria D Conway to me for evaluation. Below are the relevant portions of my assessment and plan of care.  Assessment:  1. At increased risk of breast cancer    The patient is aware that high risk breast cancer patients are typically identified because of a strong family history for breast cancer, atypical hyperplasia, LCIS, a genetic mutation, or history of radiation to the chest  under the age of 30.  Our risk assessment models of estimated her lifetime risk at 16%.  Her 5-year risk is 1.2%.  Based on these findings, we would not recommend supplemental MRI imaging and certainly no chemoprevention.  She is interested in genetic testing and she was referred to the genetics counselor today.  The patient does have rather dense breast tissue and I think it would be reasonable to perform clinical breast exam a little more often.  I will see her 6 months after she sees BERRY Gutierrez.      Plan:  1.  Her next mammograms will be performed in January 2025.  2.  I would like to see her back in the office in about 13 months.    If you have questions, please do not hesitate to call me. I look forward to following Maria D along with you.         Sincerely,        Franklin Blanc MD        CC: MD Jayashree Hampton, Franklin BURCH MD  07/29/24 1508  Sign when Signing Visit  Chief Complaint: Maria D Conway is a 33 y.o.. female here today for Consult        History of Present Illness:  Patient presents with management of breast cancer risk.   She is a nice 33-year-old white female with recent imaging problems.  In January 2024 the patient underwent a diagnostic mammogram as a result of some thickening noted in the 6 o'clock position and 12 o'clock position of the right breast along with  bilateral breast pain.  The mammograms and cells were personally reviewed along with the reports.  The breast tissue is heterogeneously dense.  There were no obvious masses, worrisome calcifications, or areas of architectural distortion noted in either breast.  She then had an ultrasound performed which revealed a 7 mm solid mass at the 9 o'clock position of the right breast.  There was also a complex mixed cystic and solid lesion measuring 12 mm in the right breast at 10:00.  This area had previously been biopsied revealing a fibroadenoma but it had increased in size.  There was also an 8 mm area in the 2 o'clock position of the left breast that was viewed as suspicious.  She underwent a needle biopsy of the 2 areas in the right breast and the 1 area in the left breast.  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    Since the biopsy, the patient has noted a little bit more increase in the pain that she typically has.  This is mostly in the subareolar location and is intermittent and sharp.    The patient's family history is significant for a maternal great grandmother who had breast cancer when she was postmenopausal.  She does not know her paternal side at all.    Review of Systems:  Review of Systems   Skin:         The patient denies any noticeable changes to the skin of the breast.   All other systems reviewed and are negative.     I have reviewed the ROS as documented by the MA/LPN/RN Franklin Blanc MD      Past Medical and Surgical History:  Breast Biopsy History:  Patient has had the following breast biopsies:4  Breast Cancer HIstory:  Patient does not have a past medical history of breast cancer.  Breast Operations, and year:  0  Social History     Tobacco Use   Smoking Status Former   • Current packs/day: 0.00   • Types: Cigarettes   • Quit date: 2013   • Years since quittin.5   Smokeless Tobacco Never     Obstetric History:  Patient is  premenopausal, first day of last period: 6-4-2024  Number of pregnancies:1  Number of live births: 1  Number of abortions or miscarriages: 0  Age of delivery of first child: 29  Patient breast fed, for the following lenth of time: 4 years  Length of time taking birth control pills:14 years  Patient has never taken hormone replacement    Past Surgical History:   Procedure Laterality Date   • BREAST BIOPSY      2019   • UPPER GASTROINTESTINAL ENDOSCOPY  2008    No issues   • WISDOM TOOTH EXTRACTION         Past Medical History:   Diagnosis Date   • Migraine        Prior Hospitalizations, other than for surgery or childbirth, and year:  None    Social History:  Patient is .  Patient has 1 daughters.    Family History:  Family History   Problem Relation Age of Onset   • Cancer Maternal Grandmother         Colon Cancer   • Cancer Other         Breast Cancer, Colon Cancer   • Cancer Other         Colon Cancer       Vital Signs:  Vitals:    07/29/24 1419   BP: 130/80   Pulse: 64   SpO2: 99%       Medications:    Current Outpatient Prescriptions:     Current Outpatient Medications:   •  KRILL OIL PO, Take  by mouth Daily., Disp: , Rfl:   •  magnesium, as, gluconate (MAGONATE) 500 (27 Mg) MG tablet, Take 1 tablet by mouth 2 (Two) Times a Day., Disp: , Rfl:   •  Prenatal Vit-Fe Fumarate-FA (PRENATAL, CLASSIC, VITAMIN) 28-0.8 MG tablet tablet, Take  by mouth Daily., Disp: , Rfl:   •  Probiotic Product (PROBIOTIC DAILY PO), Take  by mouth Daily., Disp: , Rfl:     Physical Examination:  General Appearance:   Patient is in no distress.  She is well kept and has a BMI of 23.  Psychiatric:  Patient with appropriate mood and affect. Alert and oriented to self, time, and place.    Breast, RIGHT:  medium sized, symmetric with the contralateral side.  Breast skin is without erythema, edema, rashes.  There are no visible abnormalities upon inspection during the arm-raising maneuver or with hands on hips in the sitting position.  There is no nipple retraction, discharge or nipple/areolar skin changes.There are no masses palpable in the sitting or supine positions.  The breast tissue is quite dense in the upper outer quadrant.    Breast, LEFT:  medium sized, symmetric with the contralateral side.  Breast skin is without erythema, edema, rashes.  There are no visible abnormalities upon inspection during the arm-raising maneuver or with hands on hips in the sitting position. There is no nipple retraction, discharge or nipple/areolar skin changes.There are no masses palpable in the sitting or supine positions.  There is a lot of density in the upper outer quadrant similar to the other side.    Lymphatic:  There is no axillary, cervical, infraclavicular, or supraclavicular adenopathy bilaterally.    Gastrointestinal:  Abdomen is soft, nondistended, and nontender.  There was no obvious hepatosplenomegaly or abdominal mass.  There are no scars from previous surgery.    Musculoskeletal:  Good strength in all 4 extremities.   There is good range of motion in both shoulders.      Assessment:  1. At increased risk of breast cancer    The patient is aware that high risk breast cancer patients are typically identified because of a strong family history for breast cancer, atypical hyperplasia, LCIS, a genetic mutation, or history of radiation to the chest  under the age of 30.  Our risk assessment models of estimated her lifetime risk at 16%.  Her 5-year risk is 1.2%.  Based on these findings, we would not recommend supplemental MRI imaging and certainly no chemoprevention.  She is interested in genetic testing and she was referred to the genetics counselor today.  The patient does have rather dense breast tissue and I think it would be reasonable to perform clinical breast exam a little more often.  I will see her 6 months after she sees BERRY Gutierrez.      Plan:  1.  Her next mammograms will be performed in January 2025.  2.  I would like to see  her back in the office in about 13 months.      CPT coding:    Next Appointment:  No follow-ups on file.            EMR Dragon/transcription disclaimer:    Much of this encounter note is an electronic transcription/translocation of spoken language to printed text.  The electronic translation of spoken language may permit erroneous, or at times, nonsensical words or phrases to be inadvertently transcribed.  Although I have reviewed the note from such areas, some may still exist.

## 2024-07-29 NOTE — PROGRESS NOTES
Chief Complaint: Maria D Conway is a 33 y.o.. female here today for Consult        History of Present Illness:  Patient presents with management of breast cancer risk.   She is a nice 33-year-old white female with recent imaging problems.  In January 2024 the patient underwent a diagnostic mammogram as a result of some thickening noted in the 6 o'clock position and 12 o'clock position of the right breast along with bilateral breast pain.  The mammograms and cells were personally reviewed along with the reports.  The breast tissue is heterogeneously dense.  There were no obvious masses, worrisome calcifications, or areas of architectural distortion noted in either breast.  She then had an ultrasound performed which revealed a 7 mm solid mass at the 9 o'clock position of the right breast.  There was also a complex mixed cystic and solid lesion measuring 12 mm in the right breast at 10:00.  This area had previously been biopsied revealing a fibroadenoma but it had increased in size.  There was also an 8 mm area in the 2 o'clock position of the left breast that was viewed as suspicious.  She underwent a needle biopsy of the 2 areas in the right breast and the 1 area in the left breast.  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    Since the biopsy, the patient has noted a little bit more increase in the pain that she typically has.  This is mostly in the subareolar location and is intermittent and sharp.    The patient's family history is significant for a maternal great grandmother who had breast cancer when she was postmenopausal.  She does not know her paternal side at all.    Review of Systems:  Review of Systems   Skin:         The patient denies any noticeable changes to the skin of the breast.   All other systems reviewed and are negative.     I have reviewed the ROS as documented by the MA/LPN/RN Franklin Blanc MD      Past Medical and Surgical  History:  Breast Biopsy History:  Patient has had the following breast biopsies:4  Breast Cancer HIstory:  Patient does not have a past medical history of breast cancer.  Breast Operations, and year:  0  Social History     Tobacco Use   Smoking Status Former    Current packs/day: 0.00    Types: Cigarettes    Quit date: 2013    Years since quittin.5   Smokeless Tobacco Never     Obstetric History:  Patient is premenopausal, first day of last period: 2024  Number of pregnancies:1  Number of live births: 1  Number of abortions or miscarriages: 0  Age of delivery of first child: 29  Patient breast fed, for the following lenth of time: 4 years  Length of time taking birth control pills:14 years  Patient has never taken hormone replacement    Past Surgical History:   Procedure Laterality Date    BREAST BIOPSY      2019    UPPER GASTROINTESTINAL ENDOSCOPY      No issues    WISDOM TOOTH EXTRACTION         Past Medical History:   Diagnosis Date    Migraine        Prior Hospitalizations, other than for surgery or childbirth, and year:  None    Social History:  Patient is .  Patient has 1 daughters.    Family History:  Family History   Problem Relation Age of Onset    Cancer Maternal Grandmother         Colon Cancer    Cancer Other         Breast Cancer, Colon Cancer    Cancer Other         Colon Cancer       Vital Signs:  Vitals:    24 1419   BP: 130/80   Pulse: 64   SpO2: 99%       Medications:    Current Outpatient Prescriptions:     Current Outpatient Medications:     KRILL OIL PO, Take  by mouth Daily., Disp: , Rfl:     magnesium, as, gluconate (MAGONATE) 500 (27 Mg) MG tablet, Take 1 tablet by mouth 2 (Two) Times a Day., Disp: , Rfl:     Prenatal Vit-Fe Fumarate-FA (PRENATAL, CLASSIC, VITAMIN) 28-0.8 MG tablet tablet, Take  by mouth Daily., Disp: , Rfl:     Probiotic Product (PROBIOTIC DAILY PO), Take  by mouth Daily., Disp: , Rfl:     Physical Examination:  General Appearance:   Patient is  in no distress.  She is well kept and has a BMI of 23.  Psychiatric:  Patient with appropriate mood and affect. Alert and oriented to self, time, and place.    Breast, RIGHT:  medium sized, symmetric with the contralateral side.  Breast skin is without erythema, edema, rashes.  There are no visible abnormalities upon inspection during the arm-raising maneuver or with hands on hips in the sitting position. There is no nipple retraction, discharge or nipple/areolar skin changes.There are no masses palpable in the sitting or supine positions.  The breast tissue is quite dense in the upper outer quadrant.    Breast, LEFT:  medium sized, symmetric with the contralateral side.  Breast skin is without erythema, edema, rashes.  There are no visible abnormalities upon inspection during the arm-raising maneuver or with hands on hips in the sitting position. There is no nipple retraction, discharge or nipple/areolar skin changes.There are no masses palpable in the sitting or supine positions.  There is a lot of density in the upper outer quadrant similar to the other side.    Lymphatic:  There is no axillary, cervical, infraclavicular, or supraclavicular adenopathy bilaterally.    Gastrointestinal:  Abdomen is soft, nondistended, and nontender.  There was no obvious hepatosplenomegaly or abdominal mass.  There are no scars from previous surgery.    Musculoskeletal:  Good strength in all 4 extremities.   There is good range of motion in both shoulders.      Assessment:  1. At increased risk of breast cancer    The patient is aware that high risk breast cancer patients are typically identified because of a strong family history for breast cancer, atypical hyperplasia, LCIS, a genetic mutation, or history of radiation to the chest  under the age of 30.  Our risk assessment models of estimated her lifetime risk at 16%.  Her 5-year risk is 1.2%.  Based on these findings, we would not recommend supplemental MRI imaging and certainly  no chemoprevention.  She is interested in genetic testing and she was referred to the genetics counselor today.  The patient does have rather dense breast tissue and I think it would be reasonable to perform clinical breast exam a little more often.  I will see her 6 months after she sees BERRY Gutierrez.      Plan:  1.  Her next mammograms will be performed in January 2025.  2.  I would like to see her back in the office in about 13 months.      CPT coding:    Next Appointment:  No follow-ups on file.            EMR Dragon/transcription disclaimer:    Much of this encounter note is an electronic transcription/translocation of spoken language to printed text.  The electronic translation of spoken language may permit erroneous, or at times, nonsensical words or phrases to be inadvertently transcribed.  Although I have reviewed the note from such areas, some may still exist.

## 2024-08-01 ENCOUNTER — PATIENT ROUNDING (BHMG ONLY) (OUTPATIENT)
Dept: MAMMOGRAPHY | Facility: CLINIC | Age: 34
End: 2024-08-01
Payer: COMMERCIAL

## 2024-08-01 NOTE — PROGRESS NOTES
August 1, 2024    Hello, may I speak with Maria D Conway?    My name is Isabel      I am  with [facility]  [address] [address] [phone].    Before we get started may I verify your date of birth? 1990    I am calling to officially welcome you to our practice and ask about your recent visit. Is this a good time to talk? No, pt did not answer. MB    Tell me about your visit with us. What things went well?         We're always looking for ways to make our patients' experiences even better. Do you have recommendations on ways we may improve?      Overall were you satisfied with your first visit to our practice?        I appreciate you taking the time to speak with me today. Is there anything else I can do for you?       Thank you, and have a great day.      
liquor

## 2024-08-08 ENCOUNTER — TELEPHONE (OUTPATIENT)
Dept: GENETICS | Facility: HOSPITAL | Age: 34
End: 2024-08-08
Payer: COMMERCIAL

## 2024-08-09 ENCOUNTER — DOCUMENTATION (OUTPATIENT)
Dept: GENETICS | Facility: HOSPITAL | Age: 34
End: 2024-08-09
Payer: COMMERCIAL

## 2024-08-09 NOTE — PROGRESS NOTES
Maria D Conway, a 33 y.o. female, was referred for genetic counseling due to a family history of cancer. Ms. Conway has no personal history of cancer. she was 9 at menarche and had her first child at age 29. she is pre-menopausal and retains her uterus and ovaries. Ms. Conway is following with Dr. Blanc in 13 months and reports she is starting annual mammograms. she has had 4 biopsies, 3 in 2014 and one in 2019, which were benign. she has not yet started colonoscopies. she was interested in discussing her risk for a hereditary cancer syndrome. Ms. Conway decided to pursue comprehensive genetic testing to evaluate her risk of cancer, therefore the Multi-Cancer Panel was ordered through [facility] which analyzes 70 genes associated with an increased cancer risk. Genetic testing was negative for known pathogenic mutations in the 70 genes on this panel. These results were discussed with Ms. Conway by telephone on 8/9/2024.     PERTINENT FAMILY HISTORY: (See attached pedigree)   Pat. Grandfather:                                            Unknown cancer  Mat. Grandfather:                                            Prostate cancer, late 60s  Mat. Great-Grandfather 1:                              Colon cancer, 81  Mat. Great-Grandmother 1:                            Breast cancer, late 60s                                                                          Colon cancer, 90  Mat. Grandmother:                                          Colon cancer, 72  Mat. Great-Grandfather 2:                              Bone cancer, unsure if primary or metastatic  Mat. Great-Grandmother 2:                            Colon cancer     We do not have medical records regarding any of these diagnoses.      RISK ASSESSMENT:  Ms. Conway's family history of  cancer raises the question of a hereditary cancer syndrome. She does not meet NCCN guidelines for genetic testing. Despite this, we discussed how genetic testing is still available to  her. This risk assessment is based on the family history information provided at the time of the appointment.  The assessment could change in the future should new information be obtained.    At this time, Ms. Willoughbys management should be guided by a family history-based risk assessment. Tyrer-Cuzick, version 8 is able to take into account personal factors (age at menarche, age at first live birth, breast density, etc) and family history when calculating risk for breast cancer.  Computer modeling estimates that Ms. Willoughbys lifetime personal risk for developing breast cancer is up to 16.1% (Jose Juan-Tock, v8), compared to the average 35 y.o. female's risk of 11.1%. In general, a lifetime risk above 20% is considered to be “high risk” where increased screening is warranted; Ms. You risk does not fall into that category. This risk assessment is based on the family history information provided at the time of the appointment and could change in the future should new information be obtained.     GENETIC COUNSELING (30 minutes):  We reviewed the family history information in detail. Cases of cancer follow three general patterns: sporadic, familial, and hereditary.  While most cancer is sporadic, some cases appear to occur in family clusters.  These cases are said to be familial and account for 10-20% of cancer cases.  Familial cases may be due to a combination of shared genes and environmental factors among family members.  In even fewer cases, the risk for cancer is inherited, and the genes responsible for the increased cancer risk are known.       Family histories typical of hereditary cancer syndromes usually include multiple first- and second-degree relatives diagnosed with cancer types that define a syndrome.  These cases tend to be diagnosed at younger-than-expected ages and can be bilateral or multifocal.  The cancer in these families follows an autosomal dominant inheritance pattern, which indicates the  likely presence of a mutation in a cancer susceptibility gene.  Children and siblings of an individual believed to carry this mutation have a 50% chance of inheriting that mutation, thereby inheriting the increased risk to develop cancer.  These mutations can be passed down from the maternal or the paternal lineage.     Due to Ms. Conway's family history of breast cancer, we discussed hereditary breast cancer. Hereditary breast cancer accounts for 5-10% of all cases of breast cancer.  A significant proportion of hereditary breast cancer can be attributed to mutations in the BRCA1 and BRCA2 genes.  Mutations in these genes confer an increased risk for breast cancer, ovarian cancer, male breast cancer, prostate cancer, and pancreatic cancer.  Women with a BRCA1 or BRCA2 mutation have over a 60% lifetime risk of breast cancer and up to a 58% risk of ovarian cancer.  There are other clinically significant breast cancer related genes in addition to BRCA1/2.       There are other genes that are known to be associated with an increased risk for cancer.  Some of these genes have well defined cancer risks and established management guidelines. Other genes that can be tested for have been more recently described, and there may be less data regarding the risks and therefore may not have established management guidelines. We discussed these limitations at length. Based on Ms. Conway's desire to get as much information as possible regarding her personal risks and potential risks for her family, she opted to pursue testing through a panel that would look at several other genes known to increase the risk for cancer.     GENETIC TESTING:  The risks, benefits, and limitations of genetic testing and implications for clinical management following testing were reviewed. DNA test results can influence decisions regarding screening and prevention. Genetic testing can have significant psychological implications for both individuals and  families. Also discussed was the possibility of employment and insurance discrimination based on genetic test results and the laws in place to prevent this, as well as the limitations of these laws.       We discussed panel testing, which would involve testing 70 genes associated with increased cancer risk. The implications of a positive or negative test result were discussed.  We also discussed the importance of testing an affected relative and how a negative result for Ms. Conway wouldn't necessarily mean the cancers presenting in her family weren't due to a genetic mutation that Ms. Conway did not inherit.  In general, a negative genetic test result is most informative if a mutation has first been established in an affected member of the family.  In cases where an affected individual is not available or interested in testing, it is appropriate to offer testing to an unaffected individual.      We discussed the possibility that, in some cases, genetic test results may be ambiguous due to the identification of a genetic variant of uncertain significance (VUS). These variants may or may not be associated with an increased cancer risk. With multigene panel testing, it is not uncommon for a VUS to be identified.  If a VUS is identified, testing family members is typically not recommended and screening recommendations are made based on the family history.  The laboratories that perform genetic testing work to reclassify the VUS and send out an amended report if and when a VUS is reclassified.  The majority of variant findings are ultimately reclassified to a negative result. Given Ms. Conway's family history, a negative test result does not eliminate all cancer risk, although the risk may not be as high as it would with positive genetic testing.      TEST RESULTS:  Genetic testing was negative for known pathogenic mutations by sequencing and rearrangement testing for the 70 genes included on the Multi-Cancer panel.    This negative result greatly lowers but does not eliminate the risk of a hereditary cancer syndrome for Ms. Conway. This assessment is based on the information provided at the time of consultation and could change should new information be obtained regarding her personal and/or family history.     CANCER PREVENTION:  Options available to individuals with an elevated lifetime risk for breast and/or ovarian cancer were briefly discussed.  Based on computer modeling, Ms. Conway's lifetime risk for breast cancer would not be considered “high risk” (>20%).   Per NCCN guidelines, it is appropriate for her to follow general population screening guidelines for her breast cancer risk including annual clinical breast exam and annual mammography. This assessment is based on the information provided at the time of the consultation and could change should new information become available regarding the family history or if Ms. Conway were to test positive for a mutation in a cancer susceptibility gene.    PLAN:  Genetic counseling remains available to Ms. Conway and her family. She plans to continue her care with Dr. Blanc. Ms. Conway is welcome to contact us with any questions in the future at [phone].     Cecilia Wang MS, [facility] [place], [facility]  Licensed Certified Genetic Counselor    Cc: Maria D Blanc MD

## 2024-08-09 NOTE — TELEPHONE ENCOUNTER
Spoke with patient and disclosed negative genetic results. Informed patient these results would be on [facility] and sent to her

## 2024-08-14 ENCOUNTER — DOCUMENTATION (OUTPATIENT)
Dept: MAMMOGRAPHY | Facility: CLINIC | Age: 34
End: 2024-08-14
Payer: COMMERCIAL

## 2024-10-29 ENCOUNTER — TELEPHONE (OUTPATIENT)
Dept: MAMMOGRAPHY | Facility: CLINIC | Age: 34
End: 2024-10-29

## 2024-10-29 NOTE — TELEPHONE ENCOUNTER
Caller: Maria D Conway    Relationship to patient: Self    Best call back number: [phone] (home)       Patient is needing: SCHEDULE 13 MONTH F/U APPT    LAST SEEN JULY 2024. SCHEDULE WAS NOT AVAILABLE THAT FAR AHEAD AT THE TIME OF HER LAST APPT

## 2025-02-11 ENCOUNTER — TELEPHONE (OUTPATIENT)
Dept: INTERNAL MEDICINE | Facility: CLINIC | Age: 35
End: 2025-02-11

## 2025-03-24 ENCOUNTER — APPOINTMENT (OUTPATIENT)
Dept: WOMENS IMAGING | Facility: HOSPITAL | Age: 35
End: 2025-03-24
Payer: COMMERCIAL

## 2025-03-24 PROCEDURE — 77063 BREAST TOMOSYNTHESIS BI: CPT | Performed by: RADIOLOGY

## 2025-03-24 PROCEDURE — 77067 SCR MAMMO BI INCL CAD: CPT | Performed by: RADIOLOGY

## 2025-05-14 ENCOUNTER — TELEPHONE (OUTPATIENT)
Dept: INTERNAL MEDICINE | Facility: CLINIC | Age: 35
End: 2025-05-14
Payer: COMMERCIAL

## 2025-05-22 ENCOUNTER — TELEPHONE (OUTPATIENT)
Dept: INTERNAL MEDICINE | Facility: CLINIC | Age: 35
End: 2025-05-22
Payer: COMMERCIAL

## 2025-05-22 DIAGNOSIS — Z13.0 SCREENING FOR DEFICIENCY ANEMIA: ICD-10-CM

## 2025-05-22 DIAGNOSIS — E55.9 VITAMIN D DEFICIENCY: ICD-10-CM

## 2025-05-22 DIAGNOSIS — Z13.29 SCREENING FOR HYPOTHYROIDISM: ICD-10-CM

## 2025-05-22 DIAGNOSIS — Z13.1 SCREENING FOR DIABETES MELLITUS: ICD-10-CM

## 2025-05-22 DIAGNOSIS — Z13.220 SCREENING FOR HYPERLIPIDEMIA: ICD-10-CM

## 2025-05-22 DIAGNOSIS — Z13.220 SCREENING FOR HYPERLIPIDEMIA: Primary | ICD-10-CM

## 2025-05-22 NOTE — TELEPHONE ENCOUNTER
Called to verify insurance coverage with [facility]. Insurance effective. Start date 1/1/2023-current  Reference number [id number]

## 2025-05-23 LAB
25(OH)D3+25(OH)D2 SERPL-MCNC: 48.7 NG/ML (ref 30–100)
ALBUMIN SERPL-MCNC: 4.7 G/DL (ref 3.5–5.2)
ALBUMIN/GLOB SERPL: 1.9 G/DL
ALP SERPL-CCNC: 54 U/L (ref 39–117)
ALT SERPL-CCNC: 22 U/L (ref 1–33)
AST SERPL-CCNC: 23 U/L (ref 1–32)
BILIRUB SERPL-MCNC: 0.4 MG/DL (ref 0–1.2)
BUN SERPL-MCNC: 11 MG/DL (ref 6–20)
BUN/CREAT SERPL: 14.9 (ref 7–25)
CALCIUM SERPL-MCNC: 9.6 MG/DL (ref 8.6–10.5)
CHLORIDE SERPL-SCNC: 105 MMOL/L (ref 98–107)
CHOLEST SERPL-MCNC: 184 MG/DL (ref 0–200)
CO2 SERPL-SCNC: 25.4 MMOL/L (ref 22–29)
CREAT SERPL-MCNC: 0.74 MG/DL (ref 0.57–1)
EGFRCR SERPLBLD CKD-EPI 2021: 107.7 ML/MIN/1.73
ERYTHROCYTE [DISTWIDTH] IN BLOOD BY AUTOMATED COUNT: 12 % (ref 12.3–15.4)
GLOBULIN SER CALC-MCNC: 2.5 GM/DL
GLUCOSE SERPL-MCNC: 89 MG/DL (ref 65–99)
HCT VFR BLD AUTO: 43.3 % (ref 34–46.6)
HDLC SERPL-MCNC: 84 MG/DL (ref 40–60)
HGB BLD-MCNC: 14.1 G/DL (ref 12–15.9)
LDLC SERPL CALC-MCNC: 88 MG/DL (ref 0–100)
LDLC/HDLC SERPL: 1.03 {RATIO}
MCH RBC QN AUTO: 29.7 PG (ref 26.6–33)
MCHC RBC AUTO-ENTMCNC: 32.6 G/DL (ref 31.5–35.7)
MCV RBC AUTO: 91.4 FL (ref 79–97)
PLATELET # BLD AUTO: 230 10*3/MM3 (ref 140–450)
POTASSIUM SERPL-SCNC: 4.1 MMOL/L (ref 3.5–5.2)
PROT SERPL-MCNC: 7.2 G/DL (ref 6–8.5)
RBC # BLD AUTO: 4.74 10*6/MM3 (ref 3.77–5.28)
SODIUM SERPL-SCNC: 140 MMOL/L (ref 136–145)
TRIGL SERPL-MCNC: 66 MG/DL (ref 0–150)
TSH SERPL DL<=0.005 MIU/L-ACNC: 0.68 UIU/ML (ref 0.27–4.2)
VLDLC SERPL CALC-MCNC: 12 MG/DL (ref 5–40)
WBC # BLD AUTO: 6.36 10*3/MM3 (ref 3.4–10.8)

## 2025-05-29 ENCOUNTER — OFFICE VISIT (OUTPATIENT)
Dept: INTERNAL MEDICINE | Facility: CLINIC | Age: 35
End: 2025-05-29
Payer: COMMERCIAL

## 2025-05-29 VITALS
RESPIRATION RATE: 18 BRPM | TEMPERATURE: 97.8 F | BODY MASS INDEX: 22.76 KG/M2 | WEIGHT: 141.6 LBS | OXYGEN SATURATION: 96 % | SYSTOLIC BLOOD PRESSURE: 126 MMHG | HEIGHT: 66 IN | HEART RATE: 84 BPM | DIASTOLIC BLOOD PRESSURE: 84 MMHG

## 2025-05-29 DIAGNOSIS — R07.89 BURNING CHEST PAIN: ICD-10-CM

## 2025-05-29 DIAGNOSIS — Z00.00 ANNUAL PHYSICAL EXAM: Primary | ICD-10-CM

## 2025-05-29 PROCEDURE — 99395 PREV VISIT EST AGE 18-39: CPT | Performed by: INTERNAL MEDICINE

## 2025-05-29 PROCEDURE — 93000 ELECTROCARDIOGRAM COMPLETE: CPT | Performed by: INTERNAL MEDICINE

## 2025-05-29 NOTE — PROGRESS NOTES
"Chief Complaint  Annual Exam (Patient is not fasting)    Subjective        Maria D Conway presents to [facility]  History of Present Illness    Discussed visits with Dr. Blanc and fibrocystic breast cysts.  Has some palpitations she reports today.  Had an episode of chest burning that lasted 90 minutes with some dizziness.     Objective   Vital Signs:  /84 (BP Location: Left arm, Patient Position: Sitting, Cuff Size: Adult)   Pulse 84   Temp 97.8 °F (36.6 °C) (Infrared)   Resp 18   Ht 167.6 cm (66\")   Wt 64.2 kg (141 lb 9.6 oz)   SpO2 96%   BMI 22.85 kg/m²   Estimated body mass index is 22.85 kg/m² as calculated from the following:    Height as of this encounter: 167.6 cm (66\").    Weight as of this encounter: 64.2 kg (141 lb 9.6 oz).    BMI is within normal parameters. No other follow-up for BMI required.      Physical Exam  Vitals reviewed.   Constitutional:       General: She is not in acute distress.     Appearance: Normal appearance.   HENT:      Head: Normocephalic and atraumatic.      Nose: Nose normal.      Mouth/Throat:      Mouth: Mucous membranes are moist.   Eyes:      Conjunctiva/sclera: Conjunctivae normal.   Cardiovascular:      Rate and Rhythm: Normal rate and regular rhythm.      Pulses: Normal pulses.      Heart sounds: Normal heart sounds.   Pulmonary:      Effort: Pulmonary effort is normal.      Breath sounds: Normal breath sounds.   Abdominal:      Palpations: Abdomen is soft.      Tenderness: There is no abdominal tenderness.   Musculoskeletal:      Right lower leg: No edema.      Left lower leg: No edema.   Skin:     General: Skin is warm and dry.   Neurological:      General: No focal deficit present.      Mental Status: She is alert.   Psychiatric:         Mood and Affect: Mood normal.        Result Review :  Reviewed Dr. Blanc's note    ECG 12 Lead    Date/Time: 5/29/2025 4:14 PM  Performed by: Libby Leal MD    Authorized by: " Libby Leal MD  Comparison: not compared with previous ECG   Rhythm: sinus rhythm    Clinical impression: normal ECG            Assessment and Plan   Diagnoses and all orders for this visit:    1. Annual physical exam (Primary)    2. Burning chest pain  -     ECG 12 Lead      Reviewed all pertinent vaccines for age and discussed healthy weight, exercise.  Patient will be screened with above labs and had physical exam and history taken.  Discussed ways to improve ASCVD health and general mental/physical health.     Episode that lasted 90 minutes with chest burning and mild dizziness.  No other symptoms.  Baseline EKG performed today without concerning findings.  I did advise to be seen at an ER or urgent care should the symptoms return and last for such a significant amount of time.         Follow Up   Return in about 1 year (around 5/29/2026) for Annual physical.  Patient was given instructions and counseling regarding her condition or for health maintenance advice. Please see specific information pulled into the AVS if appropriate.

## 2025-08-05 ENCOUNTER — OFFICE VISIT (OUTPATIENT)
Dept: SURGERY | Facility: CLINIC | Age: 35
End: 2025-08-05
Payer: COMMERCIAL

## 2025-08-05 VITALS
DIASTOLIC BLOOD PRESSURE: 72 MMHG | OXYGEN SATURATION: 98 % | SYSTOLIC BLOOD PRESSURE: 124 MMHG | BODY MASS INDEX: 22.92 KG/M2 | WEIGHT: 142.6 LBS | HEIGHT: 66 IN | HEART RATE: 74 BPM

## 2025-08-05 DIAGNOSIS — R92.30 DENSE BREAST: Primary | ICD-10-CM

## 2025-08-05 DIAGNOSIS — Z12.39 BREAST SCREENING: ICD-10-CM

## 2025-08-05 DIAGNOSIS — Z80.3 FAMILY HISTORY OF BREAST CANCER: ICD-10-CM

## 2025-08-05 RX ORDER — PIMECROLIMUS 10 MG/G
CREAM TOPICAL
COMMUNITY
Start: 2025-07-31